# Patient Record
Sex: FEMALE | Race: WHITE | NOT HISPANIC OR LATINO | Employment: UNEMPLOYED | RURAL
[De-identification: names, ages, dates, MRNs, and addresses within clinical notes are randomized per-mention and may not be internally consistent; named-entity substitution may affect disease eponyms.]

---

## 2021-02-27 ENCOUNTER — HISTORICAL (OUTPATIENT)
Dept: ADMINISTRATIVE | Facility: HOSPITAL | Age: 46
End: 2021-02-27

## 2021-03-06 ENCOUNTER — HISTORICAL (OUTPATIENT)
Dept: ADMINISTRATIVE | Facility: HOSPITAL | Age: 46
End: 2021-03-06

## 2021-08-18 ENCOUNTER — HOSPITAL ENCOUNTER (EMERGENCY)
Facility: HOSPITAL | Age: 46
Discharge: HOME | End: 2021-08-18
Attending: EMERGENCY MEDICINE
Payer: MEDICAID

## 2021-08-18 VITALS
OXYGEN SATURATION: 99 % | WEIGHT: 209.63 LBS | HEIGHT: 68 IN | TEMPERATURE: 99 F | BODY MASS INDEX: 31.77 KG/M2 | HEART RATE: 97 BPM | DIASTOLIC BLOOD PRESSURE: 87 MMHG | RESPIRATION RATE: 18 BRPM | SYSTOLIC BLOOD PRESSURE: 147 MMHG

## 2021-08-18 DIAGNOSIS — J10.1 INFLUENZA A (H1N1): Primary | ICD-10-CM

## 2021-08-18 LAB
FLUAV AG UPPER RESP QL IA.RAPID: POSITIVE
FLUBV AG UPPER RESP QL IA.RAPID: NEGATIVE
SARS-COV+SARS-COV-2 AG RESP QL IA.RAPID: NEGATIVE

## 2021-08-18 PROCEDURE — 99283 EMERGENCY DEPT VISIT LOW MDM: CPT | Mod: ,,, | Performed by: EMERGENCY MEDICINE

## 2021-08-18 PROCEDURE — 99283 EMERGENCY DEPT VISIT LOW MDM: CPT

## 2021-08-18 PROCEDURE — 87428 SARSCOV & INF VIR A&B AG IA: CPT | Performed by: EMERGENCY MEDICINE

## 2021-08-18 PROCEDURE — 99283 PR EMERGENCY DEPT VISIT,LEVEL III: ICD-10-PCS | Mod: ,,, | Performed by: EMERGENCY MEDICINE

## 2021-08-18 RX ORDER — OSELTAMIVIR PHOSPHATE 75 MG/1
75 CAPSULE ORAL 2 TIMES DAILY
Qty: 10 CAPSULE | Refills: 0 | Status: SHIPPED | OUTPATIENT
Start: 2021-08-18 | End: 2021-08-23

## 2021-08-19 ENCOUNTER — TELEPHONE (OUTPATIENT)
Dept: EMERGENCY MEDICINE | Facility: HOSPITAL | Age: 46
End: 2021-08-19

## 2021-09-22 ENCOUNTER — HOSPITAL ENCOUNTER (OUTPATIENT)
Dept: RADIOLOGY | Facility: HOSPITAL | Age: 46
Discharge: HOME | End: 2021-09-22
Attending: FAMILY MEDICINE
Payer: MEDICAID

## 2021-09-22 DIAGNOSIS — R10.2 PELVIC PAIN: ICD-10-CM

## 2021-09-22 PROCEDURE — 73523 X-RAY EXAM HIPS BI 5/> VIEWS: CPT | Mod: TC

## 2021-11-06 ENCOUNTER — HOSPITAL ENCOUNTER (EMERGENCY)
Facility: HOSPITAL | Age: 46
Discharge: HOME | End: 2021-11-06
Attending: SPECIALIST
Payer: MEDICAID

## 2021-11-06 VITALS
HEART RATE: 90 BPM | HEIGHT: 68 IN | DIASTOLIC BLOOD PRESSURE: 81 MMHG | SYSTOLIC BLOOD PRESSURE: 126 MMHG | WEIGHT: 206 LBS | OXYGEN SATURATION: 95 % | TEMPERATURE: 99 F | BODY MASS INDEX: 31.22 KG/M2 | RESPIRATION RATE: 21 BRPM

## 2021-11-06 DIAGNOSIS — R07.1 CHEST PAIN ON BREATHING: ICD-10-CM

## 2021-11-06 DIAGNOSIS — K31.89 MASS OF STOMACH: ICD-10-CM

## 2021-11-06 DIAGNOSIS — I10 PRIMARY HYPERTENSION: Primary | ICD-10-CM

## 2021-11-06 DIAGNOSIS — F41.1 GENERALIZED ANXIETY DISORDER: ICD-10-CM

## 2021-11-06 DIAGNOSIS — N30.01 ACUTE CYSTITIS WITH HEMATURIA: ICD-10-CM

## 2021-11-06 DIAGNOSIS — R07.9 CHEST PAIN IN ADULT: ICD-10-CM

## 2021-11-06 DIAGNOSIS — R91.8 MASS OF LEFT LUNG: ICD-10-CM

## 2021-11-06 DIAGNOSIS — F15.20 AMPHETAMINE USE DISORDER, MODERATE: ICD-10-CM

## 2021-11-06 DIAGNOSIS — R07.9 CHEST PAIN: ICD-10-CM

## 2021-11-06 DIAGNOSIS — R00.0 SINUS TACHYCARDIA: ICD-10-CM

## 2021-11-06 PROBLEM — F43.0 PANIC ATTACK AS REACTION TO STRESS: Status: ACTIVE | Noted: 2021-11-06

## 2021-11-06 PROBLEM — F41.0 PANIC ATTACK AS REACTION TO STRESS: Status: ACTIVE | Noted: 2021-11-06

## 2021-11-06 LAB
ALBUMIN SERPL BCP-MCNC: 3.8 G/DL (ref 3.5–5)
ALBUMIN/GLOB SERPL: 0.9 {RATIO}
ALP SERPL-CCNC: 118 U/L (ref 39–100)
ALT SERPL W P-5'-P-CCNC: 26 U/L (ref 13–56)
AMPHET UR QL SCN: POSITIVE
ANION GAP SERPL CALCULATED.3IONS-SCNC: 12 MMOL/L (ref 7–16)
APTT PPP: 32.2 SECONDS (ref 25.2–37.3)
AST SERPL W P-5'-P-CCNC: 18 U/L (ref 15–37)
BACTERIA #/AREA URNS HPF: ABNORMAL /HPF
BARBITURATES UR QL SCN: NEGATIVE
BASOPHILS # BLD AUTO: 0.02 K/UL (ref 0–0.2)
BASOPHILS NFR BLD AUTO: 0.3 % (ref 0–1)
BENZODIAZ METAB UR QL SCN: NEGATIVE
BILIRUB SERPL-MCNC: 0.4 MG/DL (ref 0–1.2)
BILIRUB UR QL STRIP: NEGATIVE
BUN SERPL-MCNC: 20 MG/DL (ref 7–18)
BUN/CREAT SERPL: 19 (ref 6–20)
CALCIUM SERPL-MCNC: 8.6 MG/DL (ref 8.5–10.1)
CANNABINOIDS UR QL SCN: NEGATIVE
CHLORIDE SERPL-SCNC: 102 MMOL/L (ref 98–107)
CLARITY UR: ABNORMAL
CO2 SERPL-SCNC: 31 MMOL/L (ref 21–32)
COCAINE UR QL SCN: NEGATIVE
COLOR UR: YELLOW
CREAT SERPL-MCNC: 1.04 MG/DL (ref 0.55–1.02)
D DIMER PPP FEU-MCNC: 2.54 ΜG/ML (ref 0–0.47)
DIFFERENTIAL METHOD BLD: ABNORMAL
EOSINOPHIL # BLD AUTO: 0.04 K/UL (ref 0–0.5)
EOSINOPHIL NFR BLD AUTO: 0.5 % (ref 1–4)
ERYTHROCYTE [DISTWIDTH] IN BLOOD BY AUTOMATED COUNT: 14.3 % (ref 11.5–14.5)
GLOBULIN SER-MCNC: 4.4 G/DL (ref 2–4)
GLUCOSE SERPL-MCNC: 94 MG/DL (ref 74–106)
GLUCOSE UR STRIP-MCNC: NEGATIVE MG/DL
HCT VFR BLD AUTO: 43.3 % (ref 38–47)
HGB BLD-MCNC: 14 G/DL (ref 12–16)
IMM GRANULOCYTES # BLD AUTO: 0.01 K/UL (ref 0–0.04)
IMM GRANULOCYTES NFR BLD: 0.1 % (ref 0–0.4)
INR BLD: 0.94 (ref 0.9–1.1)
KETONES UR STRIP-SCNC: NEGATIVE MG/DL
LEUKOCYTE ESTERASE UR QL STRIP: ABNORMAL
LYMPHOCYTES # BLD AUTO: 0.82 K/UL (ref 1–4.8)
LYMPHOCYTES NFR BLD AUTO: 10.4 % (ref 27–41)
MAGNESIUM SERPL-MCNC: 2.1 MG/DL (ref 1.7–2.3)
MCH RBC QN AUTO: 28.8 PG (ref 27–31)
MCHC RBC AUTO-ENTMCNC: 32.3 G/DL (ref 32–36)
MCV RBC AUTO: 89.1 FL (ref 80–96)
MONOCYTES # BLD AUTO: 0.33 K/UL (ref 0–0.8)
MONOCYTES NFR BLD AUTO: 4.2 % (ref 2–6)
MPC BLD CALC-MCNC: 10 FL (ref 9.4–12.4)
MUCOUS THREADS #/AREA URNS HPF: ABNORMAL /HPF
NEUTROPHILS # BLD AUTO: 6.63 K/UL (ref 1.8–7.7)
NEUTROPHILS NFR BLD AUTO: 84.5 % (ref 53–65)
NITRITE UR QL STRIP: POSITIVE
NT-PROBNP SERPL-MCNC: 43 PG/ML (ref 1–125)
OPIATES UR QL SCN: NEGATIVE
PCP UR QL SCN: NEGATIVE
PH UR STRIP: 8.5 PH UNITS
PLATELET # BLD AUTO: 299 K/UL (ref 150–400)
POTASSIUM SERPL-SCNC: 3.7 MMOL/L (ref 3.5–5.1)
PROT SERPL-MCNC: 8.2 G/DL (ref 6.4–8.2)
PROT UR QL STRIP: 30
PROTHROMBIN TIME: 12.6 SECONDS (ref 11.7–14.7)
RBC # BLD AUTO: 4.86 M/UL (ref 4.2–5.4)
RBC # UR STRIP: NEGATIVE /UL
RBC #/AREA URNS HPF: ABNORMAL /HPF
SODIUM SERPL-SCNC: 141 MMOL/L (ref 136–145)
SP GR UR STRIP: 1.02
SQUAMOUS #/AREA URNS LPF: ABNORMAL /LPF
TRI-PHOS CRY #/AREA URNS LPF: ABNORMAL /LPF
TROPONIN I SERPL HS-MCNC: <4 PG/ML
UROBILINOGEN UR STRIP-ACNC: 1 MG/DL
WBC # BLD AUTO: 7.85 K/UL (ref 4.5–11)
WBC #/AREA URNS HPF: ABNORMAL /HPF
WBC CLUMPS, UA: ABNORMAL /HPF

## 2021-11-06 PROCEDURE — 93010 EKG 12-LEAD: ICD-10-PCS | Mod: ,,, | Performed by: INTERNAL MEDICINE

## 2021-11-06 PROCEDURE — 84075 ASSAY ALKALINE PHOSPHATASE: CPT | Performed by: SPECIALIST

## 2021-11-06 PROCEDURE — 81001 URINALYSIS AUTO W/SCOPE: CPT | Performed by: SPECIALIST

## 2021-11-06 PROCEDURE — 81003 URINALYSIS AUTO W/O SCOPE: CPT | Mod: 59 | Performed by: SPECIALIST

## 2021-11-06 PROCEDURE — 99285 EMERGENCY DEPT VISIT HI MDM: CPT | Mod: 25

## 2021-11-06 PROCEDURE — C9113 INJ PANTOPRAZOLE SODIUM, VIA: HCPCS | Performed by: SPECIALIST

## 2021-11-06 PROCEDURE — 87077 CULTURE AEROBIC IDENTIFY: CPT | Performed by: SPECIALIST

## 2021-11-06 PROCEDURE — 25000242 PHARM REV CODE 250 ALT 637 W/ HCPCS: Performed by: SPECIALIST

## 2021-11-06 PROCEDURE — 85730 THROMBOPLASTIN TIME PARTIAL: CPT | Performed by: SPECIALIST

## 2021-11-06 PROCEDURE — 93005 ELECTROCARDIOGRAM TRACING: CPT

## 2021-11-06 PROCEDURE — 25500020 PHARM REV CODE 255: Performed by: SPECIALIST

## 2021-11-06 PROCEDURE — 96375 TX/PRO/DX INJ NEW DRUG ADDON: CPT

## 2021-11-06 PROCEDURE — 25000003 PHARM REV CODE 250: Performed by: SPECIALIST

## 2021-11-06 PROCEDURE — 96372 THER/PROPH/DIAG INJ SC/IM: CPT | Mod: 59

## 2021-11-06 PROCEDURE — 80307 DRUG TEST PRSMV CHEM ANLYZR: CPT | Performed by: SPECIALIST

## 2021-11-06 PROCEDURE — 85610 PROTHROMBIN TIME: CPT | Performed by: SPECIALIST

## 2021-11-06 PROCEDURE — 93010 ELECTROCARDIOGRAM REPORT: CPT | Mod: ,,, | Performed by: INTERNAL MEDICINE

## 2021-11-06 PROCEDURE — 36415 COLL VENOUS BLD VENIPUNCTURE: CPT | Performed by: SPECIALIST

## 2021-11-06 PROCEDURE — 96365 THER/PROPH/DIAG IV INF INIT: CPT

## 2021-11-06 PROCEDURE — 80053 COMPREHEN METABOLIC PANEL: CPT | Performed by: SPECIALIST

## 2021-11-06 PROCEDURE — 85378 FIBRIN DEGRADE SEMIQUANT: CPT | Performed by: SPECIALIST

## 2021-11-06 PROCEDURE — 85025 COMPLETE CBC W/AUTO DIFF WBC: CPT | Performed by: SPECIALIST

## 2021-11-06 PROCEDURE — 99284 PR EMERGENCY DEPT VISIT,LEVEL IV: ICD-10-PCS | Mod: ,,, | Performed by: SPECIALIST

## 2021-11-06 PROCEDURE — 87086 URINE CULTURE/COLONY COUNT: CPT | Performed by: SPECIALIST

## 2021-11-06 PROCEDURE — 83735 ASSAY OF MAGNESIUM: CPT | Performed by: SPECIALIST

## 2021-11-06 PROCEDURE — 99284 EMERGENCY DEPT VISIT MOD MDM: CPT | Mod: ,,, | Performed by: SPECIALIST

## 2021-11-06 PROCEDURE — 84484 ASSAY OF TROPONIN QUANT: CPT | Performed by: SPECIALIST

## 2021-11-06 PROCEDURE — 83880 ASSAY OF NATRIURETIC PEPTIDE: CPT | Performed by: SPECIALIST

## 2021-11-06 PROCEDURE — 63600175 PHARM REV CODE 636 W HCPCS: Performed by: SPECIALIST

## 2021-11-06 RX ORDER — NITROGLYCERIN 0.4 MG/1
0.4 TABLET SUBLINGUAL
Status: COMPLETED | OUTPATIENT
Start: 2021-11-06 | End: 2021-11-06

## 2021-11-06 RX ORDER — AMLODIPINE BESYLATE 5 MG/1
5 TABLET ORAL DAILY
COMMUNITY
End: 2023-05-29 | Stop reason: CLARIF

## 2021-11-06 RX ORDER — PANTOPRAZOLE SODIUM 40 MG/10ML
40 INJECTION, POWDER, LYOPHILIZED, FOR SOLUTION INTRAVENOUS
Status: COMPLETED | OUTPATIENT
Start: 2021-11-06 | End: 2021-11-06

## 2021-11-06 RX ORDER — ENOXAPARIN SODIUM 100 MG/ML
1 INJECTION SUBCUTANEOUS
Status: COMPLETED | OUTPATIENT
Start: 2021-11-06 | End: 2021-11-06

## 2021-11-06 RX ORDER — ASPIRIN 325 MG
325 TABLET ORAL
Status: COMPLETED | OUTPATIENT
Start: 2021-11-06 | End: 2021-11-06

## 2021-11-06 RX ORDER — NITROFURANTOIN 25; 75 MG/1; MG/1
100 CAPSULE ORAL 2 TIMES DAILY
Qty: 14 CAPSULE | Refills: 0 | Status: SHIPPED | OUTPATIENT
Start: 2021-11-06 | End: 2021-11-09

## 2021-11-06 RX ADMIN — CEFTRIAXONE SODIUM 1 G: 1 INJECTION, POWDER, FOR SOLUTION INTRAMUSCULAR; INTRAVENOUS at 04:11

## 2021-11-06 RX ADMIN — IOPAMIDOL 80 ML: 755 INJECTION, SOLUTION INTRAVENOUS at 05:11

## 2021-11-06 RX ADMIN — ASPIRIN 325 MG ORAL TABLET 325 MG: 325 PILL ORAL at 04:11

## 2021-11-06 RX ADMIN — PANTOPRAZOLE SODIUM 40 MG: 40 INJECTION, POWDER, FOR SOLUTION INTRAVENOUS at 04:11

## 2021-11-06 RX ADMIN — ENOXAPARIN SODIUM 90 MG: 100 INJECTION SUBCUTANEOUS at 04:11

## 2021-11-06 RX ADMIN — NITROGLYCERIN 0.4 MG: 0.4 TABLET SUBLINGUAL at 04:11

## 2021-11-07 ENCOUNTER — TELEPHONE (OUTPATIENT)
Dept: EMERGENCY MEDICINE | Facility: HOSPITAL | Age: 46
End: 2021-11-07
Payer: MEDICAID

## 2021-11-09 RX ORDER — CEFDINIR 300 MG/1
300 CAPSULE ORAL 2 TIMES DAILY
Qty: 20 CAPSULE | Refills: 0 | Status: SHIPPED | OUTPATIENT
Start: 2021-11-09 | End: 2021-11-19

## 2021-11-10 ENCOUNTER — TELEPHONE (OUTPATIENT)
Dept: MEDSURG UNIT | Facility: HOSPITAL | Age: 46
End: 2021-11-10
Payer: MEDICAID

## 2021-11-10 LAB
UA COMPLETE W REFLEX CULTURE PNL UR: ABNORMAL
UA COMPLETE W REFLEX CULTURE PNL UR: ABNORMAL

## 2021-12-15 ENCOUNTER — HOSPITAL ENCOUNTER (EMERGENCY)
Facility: HOSPITAL | Age: 46
Discharge: HOME | End: 2021-12-15
Attending: EMERGENCY MEDICINE
Payer: MEDICAID

## 2021-12-15 VITALS
SYSTOLIC BLOOD PRESSURE: 137 MMHG | WEIGHT: 210 LBS | HEART RATE: 86 BPM | BODY MASS INDEX: 31.83 KG/M2 | TEMPERATURE: 98 F | DIASTOLIC BLOOD PRESSURE: 78 MMHG | OXYGEN SATURATION: 96 % | HEIGHT: 68 IN | RESPIRATION RATE: 18 BRPM

## 2021-12-15 DIAGNOSIS — S76.012A STRAIN OF LEFT HIP, INITIAL ENCOUNTER: Primary | ICD-10-CM

## 2021-12-15 DIAGNOSIS — S73.005A DISLOCATED HIP, LEFT, INITIAL ENCOUNTER: ICD-10-CM

## 2021-12-15 PROCEDURE — 99283 EMERGENCY DEPT VISIT LOW MDM: CPT | Mod: ,,, | Performed by: EMERGENCY MEDICINE

## 2021-12-15 PROCEDURE — 99283 PR EMERGENCY DEPT VISIT,LEVEL III: ICD-10-PCS | Mod: ,,, | Performed by: EMERGENCY MEDICINE

## 2021-12-15 PROCEDURE — 99284 EMERGENCY DEPT VISIT MOD MDM: CPT

## 2021-12-15 PROCEDURE — 96372 THER/PROPH/DIAG INJ SC/IM: CPT

## 2021-12-15 PROCEDURE — 63600175 PHARM REV CODE 636 W HCPCS: Performed by: EMERGENCY MEDICINE

## 2021-12-15 RX ORDER — ORPHENADRINE CITRATE 30 MG/ML
60 INJECTION INTRAMUSCULAR; INTRAVENOUS
Status: COMPLETED | OUTPATIENT
Start: 2021-12-15 | End: 2021-12-15

## 2021-12-15 RX ORDER — ASPIRIN 81 MG/1
81 TABLET ORAL DAILY
COMMUNITY
Start: 2021-12-04 | End: 2023-09-06 | Stop reason: CLARIF

## 2021-12-15 RX ORDER — OXYCODONE HYDROCHLORIDE 5 MG/1
5 TABLET ORAL EVERY 4 HOURS PRN
COMMUNITY
End: 2023-05-29 | Stop reason: CLARIF

## 2021-12-15 RX ORDER — TRAMADOL HYDROCHLORIDE 50 MG/1
50 TABLET ORAL EVERY 6 HOURS
COMMUNITY
End: 2023-05-29 | Stop reason: CLARIF

## 2021-12-15 RX ORDER — ACETAMINOPHEN 325 MG/1
2 CAPSULE, LIQUID FILLED ORAL 2 TIMES DAILY PRN
COMMUNITY
Start: 2021-12-04 | End: 2023-05-29 | Stop reason: CLARIF

## 2021-12-15 RX ORDER — MELOXICAM 15 MG/1
15 TABLET ORAL DAILY
COMMUNITY
Start: 2021-12-04 | End: 2023-05-29 | Stop reason: CLARIF

## 2021-12-15 RX ORDER — KETOROLAC TROMETHAMINE 30 MG/ML
30 INJECTION, SOLUTION INTRAMUSCULAR; INTRAVENOUS
Status: COMPLETED | OUTPATIENT
Start: 2021-12-15 | End: 2021-12-15

## 2021-12-15 RX ORDER — ONDANSETRON 4 MG/1
4 TABLET, FILM COATED ORAL 2 TIMES DAILY PRN
COMMUNITY
Start: 2021-12-04 | End: 2023-05-29 | Stop reason: CLARIF

## 2021-12-15 RX ADMIN — ORPHENADRINE CITRATE 60 MG: 30 INJECTION INTRAMUSCULAR; INTRAVENOUS at 08:12

## 2021-12-15 RX ADMIN — KETOROLAC TROMETHAMINE 30 MG: 30 INJECTION, SOLUTION INTRAMUSCULAR at 08:12

## 2021-12-16 ENCOUNTER — TELEPHONE (OUTPATIENT)
Dept: EMERGENCY MEDICINE | Facility: HOSPITAL | Age: 46
End: 2021-12-16
Payer: MEDICAID

## 2023-05-29 ENCOUNTER — HOSPITAL ENCOUNTER (INPATIENT)
Facility: HOSPITAL | Age: 48
LOS: 1 days | Discharge: LEFT BEFORE DISCHARGE | DRG: 194 | End: 2023-05-30
Attending: INTERNAL MEDICINE | Admitting: INTERNAL MEDICINE
Payer: MEDICAID

## 2023-05-29 DIAGNOSIS — E87.6 HYPOKALEMIA: ICD-10-CM

## 2023-05-29 DIAGNOSIS — N39.0 URINARY TRACT INFECTION WITH HEMATURIA, SITE UNSPECIFIED: ICD-10-CM

## 2023-05-29 DIAGNOSIS — J18.9 COMMUNITY ACQUIRED PNEUMONIA: Primary | ICD-10-CM

## 2023-05-29 DIAGNOSIS — E87.1 HYPONATREMIA: ICD-10-CM

## 2023-05-29 DIAGNOSIS — R31.9 URINARY TRACT INFECTION WITH HEMATURIA, SITE UNSPECIFIED: ICD-10-CM

## 2023-05-29 DIAGNOSIS — F15.10 AMPHETAMINE ABUSE: ICD-10-CM

## 2023-05-29 DIAGNOSIS — J18.9 COMMUNITY ACQUIRED PNEUMONIA OF LEFT LOWER LOBE OF LUNG: ICD-10-CM

## 2023-05-29 DIAGNOSIS — R06.02 SHORTNESS OF BREATH: ICD-10-CM

## 2023-05-29 DIAGNOSIS — N30.01 ACUTE CYSTITIS WITH HEMATURIA: ICD-10-CM

## 2023-05-29 LAB
ALBUMIN SERPL BCP-MCNC: 2.8 G/DL (ref 3.5–5)
ALBUMIN/GLOB SERPL: 0.6 {RATIO}
ALP SERPL-CCNC: 98 U/L (ref 41–108)
ALT SERPL W P-5'-P-CCNC: 31 U/L (ref 13–56)
AMPHET UR QL SCN: POSITIVE
ANION GAP SERPL CALCULATED.3IONS-SCNC: 13 MMOL/L (ref 7–16)
AST SERPL W P-5'-P-CCNC: 24 U/L (ref 15–37)
BACTERIA #/AREA URNS HPF: ABNORMAL /HPF
BARBITURATES UR QL SCN: NEGATIVE
BASOPHILS # BLD AUTO: 0.02 K/UL (ref 0–0.2)
BASOPHILS NFR BLD AUTO: 0.1 % (ref 0–1)
BENZODIAZ METAB UR QL SCN: NEGATIVE
BILIRUB SERPL-MCNC: 0.5 MG/DL (ref ?–1.2)
BILIRUB UR QL STRIP: ABNORMAL
BUN SERPL-MCNC: 16 MG/DL (ref 7–18)
BUN/CREAT SERPL: 18 (ref 6–20)
CALCIUM SERPL-MCNC: 8.1 MG/DL (ref 8.5–10.1)
CANNABINOIDS UR QL SCN: NEGATIVE
CHLORIDE SERPL-SCNC: 95 MMOL/L (ref 98–107)
CLARITY UR: ABNORMAL
CO2 SERPL-SCNC: 22 MMOL/L (ref 21–32)
COCAINE UR QL SCN: NEGATIVE
COLOR UR: ABNORMAL
CREAT SERPL-MCNC: 0.91 MG/DL (ref 0.55–1.02)
D DIMER PPP FEU-MCNC: 3.56 ΜG/ML (ref 0–0.47)
DIFFERENTIAL METHOD BLD: ABNORMAL
EGFR (NO RACE VARIABLE) (RUSH/TITUS): 77 ML/MIN/1.73M2
EOSINOPHIL # BLD AUTO: 0 K/UL (ref 0–0.5)
EOSINOPHIL NFR BLD AUTO: 0 % (ref 1–4)
ERYTHROCYTE [DISTWIDTH] IN BLOOD BY AUTOMATED COUNT: 14.6 % (ref 11.5–14.5)
FLUAV AG UPPER RESP QL IA.RAPID: NEGATIVE
FLUBV AG UPPER RESP QL IA.RAPID: NEGATIVE
GLOBULIN SER-MCNC: 4.6 G/DL (ref 2–4)
GLUCOSE SERPL-MCNC: 155 MG/DL (ref 74–106)
GLUCOSE UR STRIP-MCNC: NEGATIVE MG/DL
HCO3 UR-SCNC: 22.6 MMOL/L (ref 21–28)
HCT VFR BLD AUTO: 40.4 % (ref 38–47)
HGB BLD-MCNC: 13.3 G/DL (ref 12–16)
HYALINE CASTS #/AREA URNS LPF: ABNORMAL /LPF
IMM GRANULOCYTES # BLD AUTO: 0.09 K/UL (ref 0–0.04)
IMM GRANULOCYTES NFR BLD: 0.6 % (ref 0–0.4)
KETONES UR STRIP-SCNC: 15 MG/DL
LACTATE SERPL-SCNC: 1 MMOL/L (ref 0.4–2)
LEUKOCYTE ESTERASE UR QL STRIP: NEGATIVE
LYMPHOCYTES # BLD AUTO: 0.46 K/UL (ref 1–4.8)
LYMPHOCYTES NFR BLD AUTO: 3.1 % (ref 27–41)
MCH RBC QN AUTO: 28.2 PG (ref 27–31)
MCHC RBC AUTO-ENTMCNC: 32.9 G/DL (ref 32–36)
MCV RBC AUTO: 85.6 FL (ref 80–96)
MONOCYTES # BLD AUTO: 0.64 K/UL (ref 0–0.8)
MONOCYTES NFR BLD AUTO: 4.3 % (ref 2–6)
MPC BLD CALC-MCNC: 10.1 FL (ref 9.4–12.4)
MUCOUS THREADS #/AREA URNS HPF: ABNORMAL /HPF
NEUTROPHILS # BLD AUTO: 13.56 K/UL (ref 1.8–7.7)
NEUTROPHILS NFR BLD AUTO: 91.9 % (ref 53–65)
NITRITE UR QL STRIP: NEGATIVE
NT-PROBNP SERPL-MCNC: 580 PG/ML (ref 1–125)
OPIATES UR QL SCN: NEGATIVE
PCO2 BLDA: 31 MMHG (ref 35–48)
PCP UR QL SCN: NEGATIVE
PH SMN: 7.47 [PH] (ref 7.35–7.45)
PH UR STRIP: 6 PH UNITS
PLATELET # BLD AUTO: 248 K/UL (ref 150–400)
PO2 BLDA: 74 MMHG (ref 83–108)
POC BASE EXCESS: -0.3 MMOL/L (ref -2–3)
POC SATURATED O2: 96 %
POTASSIUM SERPL-SCNC: 3 MMOL/L (ref 3.5–5.1)
PROT SERPL-MCNC: 7.4 G/DL (ref 6.4–8.2)
PROT UR QL STRIP: >=300
RBC # BLD AUTO: 4.72 M/UL (ref 4.2–5.4)
RBC # UR STRIP: ABNORMAL /UL
RBC #/AREA URNS HPF: ABNORMAL /HPF
SARS-COV+SARS-COV-2 AG RESP QL IA.RAPID: NEGATIVE
SODIUM SERPL-SCNC: 127 MMOL/L (ref 136–145)
SP GR UR STRIP: >=1.03
SQUAMOUS #/AREA URNS LPF: ABNORMAL /LPF
TROPONIN I SERPL DL<=0.01 NG/ML-MCNC: 8.5 PG/ML
UROBILINOGEN UR STRIP-ACNC: 2 MG/DL
WBC # BLD AUTO: 14.77 K/UL (ref 4.5–11)
WBC #/AREA URNS HPF: ABNORMAL /HPF

## 2023-05-29 PROCEDURE — 94760 N-INVAS EAR/PLS OXIMETRY 1: CPT

## 2023-05-29 PROCEDURE — 81001 URINALYSIS AUTO W/SCOPE: CPT | Performed by: INTERNAL MEDICINE

## 2023-05-29 PROCEDURE — 99900035 HC TECH TIME PER 15 MIN (STAT)

## 2023-05-29 PROCEDURE — 85379 FIBRIN DEGRADATION QUANT: CPT | Performed by: INTERNAL MEDICINE

## 2023-05-29 PROCEDURE — 25000242 PHARM REV CODE 250 ALT 637 W/ HCPCS: Performed by: INTERNAL MEDICINE

## 2023-05-29 PROCEDURE — 84484 ASSAY OF TROPONIN QUANT: CPT | Performed by: INTERNAL MEDICINE

## 2023-05-29 PROCEDURE — 25000003 PHARM REV CODE 250: Performed by: INTERNAL MEDICINE

## 2023-05-29 PROCEDURE — 99222 PR INITIAL HOSPITAL CARE,LEVL II: ICD-10-PCS | Mod: ,,, | Performed by: INTERNAL MEDICINE

## 2023-05-29 PROCEDURE — 85025 COMPLETE CBC W/AUTO DIFF WBC: CPT | Performed by: INTERNAL MEDICINE

## 2023-05-29 PROCEDURE — 87186 SC STD MICRODIL/AGAR DIL: CPT | Performed by: INTERNAL MEDICINE

## 2023-05-29 PROCEDURE — 80053 COMPREHEN METABOLIC PANEL: CPT | Performed by: INTERNAL MEDICINE

## 2023-05-29 PROCEDURE — 82803 BLOOD GASES ANY COMBINATION: CPT

## 2023-05-29 PROCEDURE — 94640 AIRWAY INHALATION TREATMENT: CPT

## 2023-05-29 PROCEDURE — 96374 THER/PROPH/DIAG INJ IV PUSH: CPT

## 2023-05-29 PROCEDURE — 63600175 PHARM REV CODE 636 W HCPCS: Performed by: INTERNAL MEDICINE

## 2023-05-29 PROCEDURE — 93005 ELECTROCARDIOGRAM TRACING: CPT

## 2023-05-29 PROCEDURE — 87077 CULTURE AEROBIC IDENTIFY: CPT | Performed by: INTERNAL MEDICINE

## 2023-05-29 PROCEDURE — 80307 DRUG TEST PRSMV CHEM ANLYZR: CPT | Performed by: INTERNAL MEDICINE

## 2023-05-29 PROCEDURE — 83605 ASSAY OF LACTIC ACID: CPT | Performed by: INTERNAL MEDICINE

## 2023-05-29 PROCEDURE — 93010 EKG 12-LEAD: ICD-10-PCS | Mod: ,,, | Performed by: INTERNAL MEDICINE

## 2023-05-29 PROCEDURE — 99285 EMERGENCY DEPT VISIT HI MDM: CPT | Mod: 25

## 2023-05-29 PROCEDURE — 93010 ELECTROCARDIOGRAM REPORT: CPT | Mod: ,,, | Performed by: INTERNAL MEDICINE

## 2023-05-29 PROCEDURE — 36600 WITHDRAWAL OF ARTERIAL BLOOD: CPT

## 2023-05-29 PROCEDURE — 87040 BLOOD CULTURE FOR BACTERIA: CPT | Performed by: INTERNAL MEDICINE

## 2023-05-29 PROCEDURE — 96375 TX/PRO/DX INJ NEW DRUG ADDON: CPT

## 2023-05-29 PROCEDURE — 83880 ASSAY OF NATRIURETIC PEPTIDE: CPT | Performed by: INTERNAL MEDICINE

## 2023-05-29 PROCEDURE — 27000221 HC OXYGEN, UP TO 24 HOURS

## 2023-05-29 PROCEDURE — 87428 SARSCOV & INF VIR A&B AG IA: CPT | Performed by: INTERNAL MEDICINE

## 2023-05-29 PROCEDURE — 25500020 PHARM REV CODE 255: Performed by: INTERNAL MEDICINE

## 2023-05-29 PROCEDURE — 11000001 HC ACUTE MED/SURG PRIVATE ROOM

## 2023-05-29 PROCEDURE — 99222 1ST HOSP IP/OBS MODERATE 55: CPT | Mod: ,,, | Performed by: INTERNAL MEDICINE

## 2023-05-29 RX ORDER — ENOXAPARIN SODIUM 100 MG/ML
40 INJECTION SUBCUTANEOUS EVERY 24 HOURS
Status: DISCONTINUED | OUTPATIENT
Start: 2023-05-29 | End: 2023-05-30 | Stop reason: HOSPADM

## 2023-05-29 RX ORDER — ACETAMINOPHEN 325 MG/1
650 TABLET ORAL EVERY 4 HOURS PRN
Status: DISCONTINUED | OUTPATIENT
Start: 2023-05-29 | End: 2023-05-30 | Stop reason: HOSPADM

## 2023-05-29 RX ORDER — ONDANSETRON 2 MG/ML
4 INJECTION INTRAMUSCULAR; INTRAVENOUS EVERY 8 HOURS PRN
Status: DISCONTINUED | OUTPATIENT
Start: 2023-05-29 | End: 2023-05-30

## 2023-05-29 RX ORDER — SODIUM CHLORIDE 9 MG/ML
500 INJECTION, SOLUTION INTRAVENOUS
Status: COMPLETED | OUTPATIENT
Start: 2023-05-29 | End: 2023-05-29

## 2023-05-29 RX ORDER — POTASSIUM CHLORIDE 7.45 MG/ML
10 INJECTION INTRAVENOUS
Status: COMPLETED | OUTPATIENT
Start: 2023-05-29 | End: 2023-05-29

## 2023-05-29 RX ORDER — ACETAMINOPHEN 325 MG/1
650 TABLET ORAL EVERY 8 HOURS PRN
Status: DISCONTINUED | OUTPATIENT
Start: 2023-05-29 | End: 2023-05-30 | Stop reason: HOSPADM

## 2023-05-29 RX ORDER — SODIUM CHLORIDE 0.9 % (FLUSH) 0.9 %
10 SYRINGE (ML) INJECTION EVERY 12 HOURS PRN
Status: DISCONTINUED | OUTPATIENT
Start: 2023-05-29 | End: 2023-05-30 | Stop reason: HOSPADM

## 2023-05-29 RX ORDER — IPRATROPIUM BROMIDE AND ALBUTEROL SULFATE 2.5; .5 MG/3ML; MG/3ML
3 SOLUTION RESPIRATORY (INHALATION)
Status: DISCONTINUED | OUTPATIENT
Start: 2023-05-29 | End: 2023-05-30 | Stop reason: HOSPADM

## 2023-05-29 RX ADMIN — POTASSIUM CHLORIDE 10 MEQ: 7.46 INJECTION, SOLUTION INTRAVENOUS at 06:05

## 2023-05-29 RX ADMIN — IPRATROPIUM BROMIDE AND ALBUTEROL SULFATE 3 ML: .5; 3 SOLUTION RESPIRATORY (INHALATION) at 07:05

## 2023-05-29 RX ADMIN — AZITHROMYCIN MONOHYDRATE 500 MG: 500 INJECTION, POWDER, LYOPHILIZED, FOR SOLUTION INTRAVENOUS at 08:05

## 2023-05-29 RX ADMIN — IOPAMIDOL 100 ML: 755 INJECTION, SOLUTION INTRAVENOUS at 06:05

## 2023-05-29 RX ADMIN — SODIUM CHLORIDE 500 ML: 9 INJECTION, SOLUTION INTRAVENOUS at 06:05

## 2023-05-29 RX ADMIN — ENOXAPARIN SODIUM 40 MG: 40 INJECTION SUBCUTANEOUS at 08:05

## 2023-05-29 RX ADMIN — CEFTRIAXONE SODIUM 1 G: 1 INJECTION, POWDER, FOR SOLUTION INTRAMUSCULAR; INTRAVENOUS at 06:05

## 2023-05-29 NOTE — Clinical Note
Diagnosis: Community acquired pneumonia [403126]   Admitting Provider:: GUILLE RUDOLPH [[id number]]   Future Attending Provider: GUILLE RUDOLPH [[id number]]   Reason for IP Medical Treatment  (Clinical interventions that can only be accomplished in the IP setting? ) :: IV antibiotics, oxygen   I certify that Inpatient services for greater than or equal to 2 midnights are medically necessary:: Yes   Plans for Post-Acute care--if anticipated (pick the single best option):: A. No post acute care anticipated at this time

## 2023-05-29 NOTE — H&P
Problem 1.  Community-acquired left lower lobe pneumonia     Problem 2.  Hypokalemia     Problem 3.  Hyponatremia     Problem 4.  Amphetamine abuse      Problem 5.  Urinary tract infection    Patient is a 48-year-old white female that was brought in by ambulance with shortness breath for several days.  She states it was probably 3-4 days with nonproductive sputum.  She denies any chest pain, fever chills but she did have some nausea vomiting [facility] where they had to give her Zofran.  The patient was seen here in the past in 2021 with chest pain workup and shortness breath with a CT of the chest that showed possible left hilar mass but she admits to not being follow up.  She is not see a doctor, does not take any medications.  In the emergency room she was found to have left lower pneumonia, white count [id number], cardiac workup was negative.  She is been admitted now for IV antibiotics.      Past medical history:  No medical medications or history     Review of systems:  12 point review of systems noncontributory except for present illness     Physical exam:  Patient is alert aware no acute distress no focal deficits     HEENT:  PERRLA EOMI     Neck:  Supple no JVD bruits or masses     Lungs:  Rales rhonchi left base but no wheezes or bronchospasm     Heart:  Normal sinus no murmur gallop rub     Abdomen:  Soft bowel sounds normal nontender no guarding rebound     Extremities:  No peripheral edema cyanosis discoloration     Neurology:  No focal neurologic deficits     Chest x-ray:  Left lower lobe infiltrate consistent with pneumonia     EKG:  Normal sinus rhythm  without ischemic changes     Urinalysis:  Positive white blood cells, red blood cells, and urine drug screen positive for amphetamines     Plan 1.:  IV Rocephin and Zithromax, oxygen, potassium replacement, sodium replacement, urine culture,

## 2023-05-29 NOTE — ED PROVIDER NOTES
Encounter Date: 2023       History     Chief Complaint   Patient presents with    Shortness of Breath    Cough    Vomiting    Headache     Patient comes in complaining of shortness of breath nausea vomiting for last 2 days.  Patient states she does not take any medications in his not being followed by any primary care provider.  Patient denies any fever chills.      Old records in  shows patient was here for shortness breath and chest pain, workup revealed a left perihilar mass concerning for malignancy, patient has not had follow-up of that mass.  Patient also had used amphetamines at the time.      Review of patient's allergies indicates:  No Known Allergies  Past Medical History:   Diagnosis Date    Hypertension      Past Surgical History:   Procedure Laterality Date     SECTION      CHOLECYSTECTOMY      EYE SURGERY Left     HIP SURGERY Left     PLATE    JOINT REPLACEMENT Left     HIP    LEG SURGERY Left     TONSILLECTOMY       History reviewed. No pertinent family history.  Social History     Tobacco Use    Smoking status: Every Day     Packs/day: 0.50     Types: Cigarettes    Smokeless tobacco: Never   Substance Use Topics    Alcohol use: Never    Drug use: Never     Review of Systems   Constitutional:  Negative for fever.   HENT:  Negative for sore throat.    Respiratory:  Negative for shortness of breath.    Cardiovascular:  Negative for chest pain.   Gastrointestinal:  Negative for nausea.   Genitourinary:  Negative for dysuria.   Musculoskeletal:  Negative for back pain.   Skin:  Negative for rash.   Neurological:  Negative for weakness.   Hematological:  Does not bruise/bleed easily.     Physical Exam     Initial Vitals [23 1731]   BP Pulse Resp Temp SpO2   (!) 149/78 94 17 98.1 °F (36.7 °C) 95 %      MAP       --         Physical Exam    Vitals reviewed.  Constitutional: She appears well-developed. No distress.   Eyes: Pupils are equal, round, and reactive to light.   Neck: Trachea  normal. Neck supple.   Normal range of motion.   Full passive range of motion without pain.     Cardiovascular:  Normal rate, regular rhythm, normal heart sounds and normal pulses.           Pulmonary/Chest: No accessory muscle usage. No respiratory distress. She has decreased breath sounds. She has rhonchi.   Abdominal: Abdomen is soft and flat. Bowel sounds are normal.   Musculoskeletal:         General: Normal range of motion.      Cervical back: Full passive range of motion without pain, normal range of motion and neck supple.     Neurological: She is alert. She has normal strength and normal reflexes. No cranial nerve deficit. GCS eye subscore is 4. GCS verbal subscore is 5. GCS motor subscore is 6.   Skin: Skin is warm.   Psychiatric: She has a normal mood and affect.       Medical Screening Exam   See Full Note    ED Course   Procedures  Labs Reviewed   COMPREHENSIVE METABOLIC PANEL - Abnormal; Notable for the following components:       Result Value    Sodium 127 (*)     Potassium 3.0 (*)     Chloride 95 (*)     Glucose 155 (*)     Calcium 8.1 (*)     Albumin 2.8 (*)     Globulin 4.6 (*)     All other components within normal limits   NT-PRO NATRIURETIC PEPTIDE - Abnormal; Notable for the following components:    ProBNP 580 (*)     All other components within normal limits   D DIMER, QUANTITATIVE - Abnormal; Notable for the following components:    D-Dimer 3.56 (*)     All other components within normal limits   CBC WITH DIFFERENTIAL - Abnormal; Notable for the following components:    WBC 14.77 (*)     RDW 14.6 (*)     Neutrophils % 91.9 (*)     Lymphocytes % 3.1 (*)     Neutrophils, Abs 13.56 (*)     Lymphocytes, Absolute 0.46 (*)     Eosinophils % 0.0 (*)     Immature Granulocytes % 0.6 (*)     Immature Granulocytes, Absolute 0.09 (*)     All other components within normal limits   DRUG SCREEN, URINE (BEAKER) - Abnormal; Notable for the following components:    Amphetamine, Urine Positive (*)     All  other components within normal limits   URINALYSIS, REFLEX TO URINE CULTURE - Abnormal; Notable for the following components:    Protein, UA >=300 (*)     Ketones, UA 15 (*)     Urobilinogen, UA 2.0 (*)     Bilirubin, UA Moderate (*)     Blood, UA Large (*)     Specific Gravity, UA >=1.030 (*)     All other components within normal limits   URINALYSIS, MICROSCOPIC - Abnormal; Notable for the following components:    WBC, UA 20-50 (*)     RBC, UA 10-15 (*)     Bacteria, UA Moderate (*)     Squamous Epithelial Cells, UA Moderate (*)     Mucus, UA Moderate (*)     Hyaline Casts, UA 5-10 (*)     All other components within normal limits   SARS-COV2 (COVID) W/ FLU ANTIGEN - Normal    Narrative:     Negative SARS-CoV results should not be used as the sole basis for treatment or patient management decisions; negative results should be considered in the context of a patient's recent exposures, history and the presene of clinical signs and symptoms consistent with COVID-19.  Negative results should be treated as presumptive and confirmed by molecular assay, if necessary for patient management.   LACTIC ACID, PLASMA - Normal   TROPONIN I - Normal   CULTURE, BLOOD   CULTURE, BLOOD   CULTURE, URINE   CBC W/ AUTO DIFFERENTIAL    Narrative:     The following orders were created for panel order CBC Auto Differential.  Procedure                               Abnormality         Status                     ---------                               -----------         ------                     CBC with Differential[[id number]]        Abnormal            Final result                 Please view results for these tests on the individual orders.     EKG Readings: (Independently Interpreted)   Initial Reading: No STEMI. Previous EKG: Compared with most recent EKG Previous EKG Date: 11/6/2021. Rhythm: Normal Sinus Rhythm. Heart Rate: 83. Ectopy: No Ectopy. Conduction: Normal. ST Segments: Normal ST Segments. T Waves: Normal. Axis: Normal.  Clinical Impression: Normal Sinus Rhythm   Normal sinus rhythm rate of 83 and no acute ischemic changes   ECG Results              EKG 12-lead (In process)  Result time 05/29/23 18:15:39      In process by Interface, Lab In [facility] (05/29/23 18:15:39)                   Narrative:    Test Reason : R06.02,    Vent. Rate : 083 BPM     Atrial Rate : 083 BPM     P-R Int : 138 ms          QRS Dur : 082 ms      QT Int : 406 ms       P-R-T Axes : 035 054 026 degrees     QTc Int : 477 ms    Normal sinus rhythm  Cannot rule out Anterior infarct ,age undetermined  Abnormal ECG  When compared with ECG of 06-NOV-2021 15:51,  No significant change was found    Referred By: [email or web address]   SELF           Confirmed By:                                   Imaging Results              CTA Chest Non-Coronary (PE Studies) (In process)                      X-Ray Chest 1 View (Final result)  Result time 05/29/23 18:16:18      Final result by Sanya Nagy MD (05/29/23 18:16:18)                   Impression:      Left lower lobe pneumonia.    Place of service: [facility]      Electronically signed by: Sanya Nagy  Date:    05/29/2023  Time:    18:16               Narrative:    EXAMINATION:  XR CHEST 1 VIEW    CLINICAL HISTORY:  shortness of breath;    TECHNIQUE:  AP portable chest    COMPARISON:  11/06/2021    FINDINGS:  The cardiomediastinal silhouette is within normal limits.  Left basilar opacities are noted with blunting of the costophrenic angle.   the lungs are otherwise predominantly clear.  There is no pneumothorax or pleural effusion.    There is no acute osseous or soft tissue abnormality.                                       Medications   cefTRIAXone (ROCEPHIN) 1 g in dextrose 5 % in water (D5W) 5 % 50 mL IVPB (MB+) (has no administration in time range)   potassium chloride 10 mEq in 100 mL IVPB (has no administration in time range)   sodium chloride 0.9% flush 10 mL (has no administration in time range)    enoxaparin injection 40 mg (has no administration in time range)   cefTRIAXone (ROCEPHIN) 1 g in dextrose 5 % in water (D5W) 5 % 50 mL IVPB (MB+) (has no administration in time range)   azithromycin (ZITHROMAX) 500 mg in dextrose 5 % (D5W) 250 mL IVPB (Vial-Mate) (has no administration in time range)   acetaminophen tablet 650 mg (has no administration in time range)   ondansetron injection 4 mg (has no administration in time range)   albuterol-ipratropium 2.5 mg-0.5 mg/3 mL nebulizer solution 3 mL (has no administration in time range)   acetaminophen tablet 650 mg (has no administration in time range)     Medical Decision Making:   History:   Old Medical Records: I decided to obtain old medical records.  Old Records Summarized: records from clinic visits and records from previous admission(s).       <> Summary of Records: Patient workup in 2021 for chest pain shortness breath with a CTA of the chest showed left lung mass concerning for malignancy.  The patient has not had any follow up.  Initial Assessment:   Patient presents with shortness breath nausea vomiting for 2 days.  Differential Diagnosis:   Rule out pneumonia, pulmonary embolus, congestive heart failure, electrolyte imbalance,  Clinical Tests:   Lab Tests: Ordered and Reviewed  The following lab test(s) were unremarkable: CBC, CMP, D-Dimer, Troponin and BNP       <> Summary of Lab:   CBC is 214[address], D-dimer is elevated  Radiological Study: Ordered and Reviewed  Medical Tests: Ordered and Reviewed  ED Management:   Chest x-ray consistent with left lower lobe pneumonia, white count elevated 14,000, will start on antibiotics and admit.  ABG shows pH 7.47, pCO2 of 31, PO2 of 74 with sat 96%.  Will get CTA of the chest to rule out PE.           ED Course as of 05/29/23 1848   Mon May 29, 2023   1754 CBC Auto Differential(!) [PW]   1806 D-Dimer(!): 3.56 [PW]   1808 D-Dimer, Quantitative(!) [PW]   1809 Lactate, Joseph: 1.0 [PW]   1819 Influenza A: Negative [PW]    1819 Influenza B, Molecular: Negative [PW]   1819 COVID-19 Ag: Negative [PW]   1819 NT-proBNP(!): 580 [PW]   1819 Troponin I High Sensitivity: 8.5 [PW]   1820 Comprehensive Metabolic Panel(!) [PW]   1820 X-Ray Chest 1 View [PW]   1824 Urinalysis, Reflex to Urine Culture Urine, Clean Catch(!) [PW]   1825 POCT ARTERIAL BLOOD GAS(!) [PW]   1828 Amphetamine, Urine(!): Positive [PW]   1829 Urinalysis, Microscopic(!) [PW]   1829 WBC, UA(!): 20-50 [PW]      ED Course User Index  [PW] Nathan Titus MD                Clinical Impression:   Final diagnoses:  [R06.02] Shortness of breath  [J18.9] Community acquired pneumonia (Primary)  [E87.6] Hypokalemia  [E87.1] Hyponatremia  [F15.10] Amphetamine abuse  [N39.0, R31.9] Urinary tract infection with hematuria, site unspecified        ED Disposition Condition    Admit Stable                Nathan Titus MD  05/29/23 6830

## 2023-05-29 NOTE — ED TRIAGE NOTES
PATIENT PRESENTED TO ER VIA EMS WITH C/O SOB & HEADACHE THAT STARTED YESTERDAY & VOMITING THAT STARTED TODAY; DENIES ANY OUT OF THE WAY PO INTAKE; HAS TAKEN OTC MIGRAINE MEDICATIONS APPROXIMATELY 2 HOURS PRIOR TO COMING TO ER

## 2023-05-30 VITALS
BODY MASS INDEX: 32.4 KG/M2 | OXYGEN SATURATION: 98 % | HEART RATE: 82 BPM | DIASTOLIC BLOOD PRESSURE: 77 MMHG | SYSTOLIC BLOOD PRESSURE: 134 MMHG | HEIGHT: 68 IN | WEIGHT: 213.81 LBS | RESPIRATION RATE: 20 BRPM | TEMPERATURE: 100 F

## 2023-05-30 PROBLEM — M54.50 LOW BACK PAIN: Status: ACTIVE | Noted: 2023-05-30

## 2023-05-30 PROBLEM — G51.0 BELL'S PALSY: Status: ACTIVE | Noted: 2023-05-30

## 2023-05-30 PROBLEM — G89.4 CHRONIC PAIN SYNDROME: Status: ACTIVE | Noted: 2023-05-30

## 2023-05-30 PROBLEM — N94.9 FEMALE GENITAL SYMPTOMS: Status: ACTIVE | Noted: 2023-05-30

## 2023-05-30 PROBLEM — Z01.818 PRE-OP EXAM: Status: ACTIVE | Noted: 2023-05-30

## 2023-05-30 PROBLEM — G58.9 MONONEURITIS: Status: ACTIVE | Noted: 2023-05-30

## 2023-05-30 PROBLEM — Z87.828 HISTORY OF MOTOR VEHICLE ACCIDENT: Status: ACTIVE | Noted: 2023-05-30

## 2023-05-30 PROBLEM — R13.10 DYSPHAGIA: Status: ACTIVE | Noted: 2023-05-30

## 2023-05-30 PROBLEM — R03.0 ELEVATED BLOOD PRESSURE READING: Status: ACTIVE | Noted: 2023-05-30

## 2023-05-30 PROBLEM — F32.A DEPRESSION: Status: ACTIVE | Noted: 2023-05-30

## 2023-05-30 PROBLEM — R10.2 PELVIC PAIN IN FEMALE: Status: ACTIVE | Noted: 2023-05-30

## 2023-05-30 PROBLEM — M25.552 PAIN IN LEFT HIP: Status: ACTIVE | Noted: 2023-05-30

## 2023-05-30 PROBLEM — K21.00 GASTRO-ESOPHAGEAL REFLUX DISEASE WITH ESOPHAGITIS: Status: ACTIVE | Noted: 2023-05-30

## 2023-05-30 PROBLEM — Z01.810 ENCOUNTER FOR PREPROCEDURAL CARDIOVASCULAR EXAMINATION: Status: ACTIVE | Noted: 2023-05-30

## 2023-05-30 PROBLEM — J30.9 ALLERGIC RHINITIS: Status: ACTIVE | Noted: 2023-05-30

## 2023-05-30 PROBLEM — K02.9 DENTAL CARIES, UNSPECIFIED: Status: ACTIVE | Noted: 2023-05-30

## 2023-05-30 LAB
ANION GAP SERPL CALCULATED.3IONS-SCNC: 13 MMOL/L (ref 7–16)
BASOPHILS # BLD AUTO: 0.01 K/UL (ref 0–0.2)
BASOPHILS NFR BLD AUTO: 0.1 % (ref 0–1)
BUN SERPL-MCNC: 14 MG/DL (ref 7–18)
BUN/CREAT SERPL: 17 (ref 6–20)
CALCIUM SERPL-MCNC: 8.5 MG/DL (ref 8.5–10.1)
CHLORIDE SERPL-SCNC: 97 MMOL/L (ref 98–107)
CO2 SERPL-SCNC: 24 MMOL/L (ref 21–32)
CREAT SERPL-MCNC: 0.81 MG/DL (ref 0.55–1.02)
DIFFERENTIAL METHOD BLD: ABNORMAL
EGFR (NO RACE VARIABLE) (RUSH/TITUS): 89 ML/MIN/1.73M2
EOSINOPHIL # BLD AUTO: 0 K/UL (ref 0–0.5)
EOSINOPHIL NFR BLD AUTO: 0 % (ref 1–4)
ERYTHROCYTE [DISTWIDTH] IN BLOOD BY AUTOMATED COUNT: 14.9 % (ref 11.5–14.5)
GLUCOSE SERPL-MCNC: 134 MG/DL (ref 74–106)
HCT VFR BLD AUTO: 42.1 % (ref 38–47)
HGB BLD-MCNC: 13.8 G/DL (ref 12–16)
IMM GRANULOCYTES # BLD AUTO: 0.03 K/UL (ref 0–0.04)
IMM GRANULOCYTES NFR BLD: 0.3 % (ref 0–0.4)
LYMPHOCYTES # BLD AUTO: 0.41 K/UL (ref 1–4.8)
LYMPHOCYTES NFR BLD AUTO: 3.7 % (ref 27–41)
MAGNESIUM SERPL-MCNC: 1.8 MG/DL (ref 1.7–2.3)
MCH RBC QN AUTO: 28.2 PG (ref 27–31)
MCHC RBC AUTO-ENTMCNC: 32.8 G/DL (ref 32–36)
MCV RBC AUTO: 86.1 FL (ref 80–96)
MONOCYTES # BLD AUTO: 0.51 K/UL (ref 0–0.8)
MONOCYTES NFR BLD AUTO: 4.6 % (ref 2–6)
MPC BLD CALC-MCNC: 9.9 FL (ref 9.4–12.4)
NEUTROPHILS # BLD AUTO: 10.15 K/UL (ref 1.8–7.7)
NEUTROPHILS NFR BLD AUTO: 91.3 % (ref 53–65)
PLATELET # BLD AUTO: 250 K/UL (ref 150–400)
POTASSIUM SERPL-SCNC: 3.8 MMOL/L (ref 3.5–5.1)
RBC # BLD AUTO: 4.89 M/UL (ref 4.2–5.4)
SODIUM SERPL-SCNC: 130 MMOL/L (ref 136–145)
WBC # BLD AUTO: 11.11 K/UL (ref 4.5–11)

## 2023-05-30 PROCEDURE — 99499 UNLISTED E&M SERVICE: CPT | Mod: ,,, | Performed by: FAMILY MEDICINE

## 2023-05-30 PROCEDURE — 25000242 PHARM REV CODE 250 ALT 637 W/ HCPCS: Performed by: INTERNAL MEDICINE

## 2023-05-30 PROCEDURE — 25000003 PHARM REV CODE 250: Performed by: INTERNAL MEDICINE

## 2023-05-30 PROCEDURE — 25000003 PHARM REV CODE 250: Performed by: EMERGENCY MEDICINE

## 2023-05-30 PROCEDURE — 99900035 HC TECH TIME PER 15 MIN (STAT)

## 2023-05-30 PROCEDURE — 27000221 HC OXYGEN, UP TO 24 HOURS

## 2023-05-30 PROCEDURE — 99238 HOSP IP/OBS DSCHRG MGMT 30/<: CPT | Mod: ,,, | Performed by: INTERNAL MEDICINE

## 2023-05-30 PROCEDURE — 99238 PR HOSPITAL DISCHARGE DAY,<30 MIN: ICD-10-PCS | Mod: ,,, | Performed by: INTERNAL MEDICINE

## 2023-05-30 PROCEDURE — 99499 NO LOS: ICD-10-PCS | Mod: ,,, | Performed by: FAMILY MEDICINE

## 2023-05-30 PROCEDURE — 96372 THER/PROPH/DIAG INJ SC/IM: CPT

## 2023-05-30 PROCEDURE — 85025 COMPLETE CBC W/AUTO DIFF WBC: CPT | Performed by: INTERNAL MEDICINE

## 2023-05-30 PROCEDURE — 94761 N-INVAS EAR/PLS OXIMETRY MLT: CPT

## 2023-05-30 PROCEDURE — 83735 ASSAY OF MAGNESIUM: CPT | Performed by: INTERNAL MEDICINE

## 2023-05-30 PROCEDURE — 80048 BASIC METABOLIC PNL TOTAL CA: CPT | Performed by: INTERNAL MEDICINE

## 2023-05-30 PROCEDURE — 63600175 PHARM REV CODE 636 W HCPCS: Performed by: FAMILY MEDICINE

## 2023-05-30 PROCEDURE — 94640 AIRWAY INHALATION TREATMENT: CPT

## 2023-05-30 PROCEDURE — 63600175 PHARM REV CODE 636 W HCPCS: Performed by: INTERNAL MEDICINE

## 2023-05-30 RX ORDER — ONDANSETRON 2 MG/ML
4 INJECTION INTRAMUSCULAR; INTRAVENOUS EVERY 6 HOURS PRN
Status: DISCONTINUED | OUTPATIENT
Start: 2023-05-30 | End: 2023-05-30 | Stop reason: HOSPADM

## 2023-05-30 RX ORDER — MECLIZINE HCL 12.5 MG 12.5 MG/1
25 TABLET ORAL 3 TIMES DAILY PRN
Status: DISCONTINUED | OUTPATIENT
Start: 2023-05-30 | End: 2023-05-30 | Stop reason: HOSPADM

## 2023-05-30 RX ADMIN — CEFTRIAXONE SODIUM 1 G: 1 INJECTION, POWDER, FOR SOLUTION INTRAMUSCULAR; INTRAVENOUS at 05:05

## 2023-05-30 RX ADMIN — ONDANSETRON HYDROCHLORIDE 4 MG: 2 INJECTION, SOLUTION INTRAMUSCULAR; INTRAVENOUS at 03:05

## 2023-05-30 RX ADMIN — IPRATROPIUM BROMIDE AND ALBUTEROL SULFATE 3 ML: .5; 3 SOLUTION RESPIRATORY (INHALATION) at 07:05

## 2023-05-30 RX ADMIN — ENOXAPARIN SODIUM 40 MG: 40 INJECTION SUBCUTANEOUS at 05:05

## 2023-05-30 RX ADMIN — MECLIZINE HYDROCHLORIDE 25 MG: 12.5 TABLET ORAL at 05:05

## 2023-05-30 RX ADMIN — IPRATROPIUM BROMIDE AND ALBUTEROL SULFATE 3 ML: .5; 3 SOLUTION RESPIRATORY (INHALATION) at 01:05

## 2023-05-30 RX ADMIN — AZITHROMYCIN MONOHYDRATE 500 MG: 500 INJECTION, POWDER, LYOPHILIZED, FOR SOLUTION INTRAVENOUS at 07:05

## 2023-05-30 RX ADMIN — ONDANSETRON HYDROCHLORIDE 4 MG: 2 INJECTION, SOLUTION INTRAMUSCULAR; INTRAVENOUS at 08:05

## 2023-05-30 RX ADMIN — ACETAMINOPHEN 650 MG: 325 TABLET ORAL at 11:05

## 2023-05-30 NOTE — PLAN OF CARE
Problem: Adult Inpatient Plan of Care  Goal: Plan of Care Review  Outcome: Ongoing, Progressing  Goal: Patient-Specific Goal (Individualized)  Outcome: Ongoing, Progressing     Problem: Fall Injury Risk  Goal: Absence of Fall and Fall-Related Injury  Outcome: Ongoing, Progressing  Intervention: Identify and Manage Contributors  Flowsheets (Taken 5/30/2023 1723)  Self-Care Promotion:   independence encouraged   BADL personal objects within reach   BADL personal routines maintained  Medication Review/Management:   medications reviewed   high-risk medications identified  Intervention: Promote Injury-Free Environment  Flowsheets (Taken 5/30/2023 1723)  Safety Promotion/Fall Prevention:   assistive device/personal item within reach   diversional activities provided   high risk medications identified   medications reviewed   muscle strengthening facilitated   nonskid shoes/socks when out of bed   side rails raised x 3   instructed to call staff for mobility

## 2023-05-30 NOTE — NURSING
Notified ER Md due to patient c/o severe dizziness with nausea, bp 132/80, hr 106. Pt states the dizziness is laila. Zofran given for nausea. Waiting on reply.

## 2023-05-30 NOTE — HOSPITAL COURSE
5/30/23 - pt. Is doing well this AM. Still complaining of nausea. States that she has been taking street diet pills. Still with coarse bibasilar rales. Has a mass in her lung.

## 2023-05-30 NOTE — PROGRESS NOTES
[facility] Medicine  Progress Note    Patient Name: Viviane Seaman  MRN: [id number]  Patient Class: IP- Inpatient   Admission Date: 5/29/2023  Length of Stay: 1 days  Attending Physician: Nathan Titus MD  Primary Care Provider: Rocio Carroll MD        Subjective:     Principal Problem:Acute cystitis with hematuria        HPI:  No notes on file    Overview/Hospital Course:  5/30/23 - pt. Is doing well this AM. Still complaining of nausea. States that she has been taking street diet pills. Still with coarse bibasilar rales. Has a mass in her lung.      Interval History: pt. With lung mass. Still with bibasilar rales. Will continue present treatment.    Review of Systems  Objective:     Vital Signs (Most Recent):  Temp: 98.4 °F (36.9 °C) (05/30/23 0815)  Pulse: 101 (05/30/23 0815)  Resp: 18 (05/30/23 0815)  BP: 138/78 (05/30/23 0815)  SpO2: (!) 94 % (05/30/23 0815) Vital Signs (24h Range):  Temp:  [98.1 °F (36.7 °C)-98.7 °F (37.1 °C)] 98.4 °F (36.9 °C)  Pulse:  [] 101  Resp:  [17-20] 18  SpO2:  [94 %-99 %] 94 %  BP: (104-149)/(58-78) 138/78     Weight: 97 kg (213 lb 12.8 oz)  Body mass index is 32.51 kg/m².    Intake/Output Summary (Last 24 hours) at 5/30/2023 0819  Last data filed at 5/29/2023 2124  Gross per 24 hour   Intake 300 ml   Output --   Net 300 ml         Physical Exam        Significant Labs: All pertinent labs within the past 24 hours have been reviewed.    Significant Imaging: I have reviewed all pertinent imaging results/findings within the past 24 hours.      Assessment/Plan:      * Acute cystitis with hematuria          VTE Risk Mitigation (From admission, onward)         Ordered     enoxaparin injection 40 mg  Daily         05/29/23 1840     IP VTE HIGH RISK PATIENT  Once         05/29/23 1840     Place sequential compression device  Until discontinued         05/29/23 1840                Discharge Planning   ZURDO:      Code Status: Full Code    Is the patient medically ready for discharge?:     Reason for patient still in hospital (select all that apply): Patient trending condition                     Yudi Cannon MD  Department of Hospital Medicine   [facility]

## 2023-05-30 NOTE — PLAN OF CARE
Problem: Adult Inpatient Plan of Care  Goal: Plan of Care Review  Outcome: Ongoing, Progressing  Goal: Patient-Specific Goal (Individualized)  Outcome: Ongoing, Progressing  Goal: Absence of Hospital-Acquired Illness or Injury  Outcome: Ongoing, Progressing  Goal: Optimal Comfort and Wellbeing  Outcome: Ongoing, Progressing  Goal: Readiness for Transition of Care  Outcome: Ongoing, Progressing     Problem: Fall Injury Risk  Goal: Absence of Fall and Fall-Related Injury  Outcome: Ongoing, Progressing     Problem: Nausea and Vomiting  Goal: Fluid and Electrolyte Balance  Outcome: Ongoing, Progressing     Problem: Pain Acute  Goal: Acceptable Pain Control and Functional Ability  Outcome: Ongoing, Progressing     Problem: Infection  Goal: Absence of Infection Signs and Symptoms  Outcome: Ongoing, Progressing

## 2023-05-30 NOTE — SUBJECTIVE & OBJECTIVE
Interval History: pt. With lung mass. Still with bibasilar rales. Will continue present treatment.    Review of Systems  Objective:     Vital Signs (Most Recent):  Temp: 98.4 °F (36.9 °C) (05/30/23 0815)  Pulse: 101 (05/30/23 0815)  Resp: 18 (05/30/23 0815)  BP: 138/78 (05/30/23 0815)  SpO2: (!) 94 % (05/30/23 0815) Vital Signs (24h Range):  Temp:  [98.1 °F (36.7 °C)-98.7 °F (37.1 °C)] 98.4 °F (36.9 °C)  Pulse:  [] 101  Resp:  [17-20] 18  SpO2:  [94 %-99 %] 94 %  BP: (104-149)/(58-78) 138/78     Weight: 97 kg (213 lb 12.8 oz)  Body mass index is 32.51 kg/m².    Intake/Output Summary (Last 24 hours) at 5/30/2023 0819  Last data filed at 5/29/2023 2124  Gross per 24 hour   Intake 300 ml   Output --   Net 300 ml         Physical Exam        Significant Labs: All pertinent labs within the past 24 hours have been reviewed.    Significant Imaging: I have reviewed all pertinent imaging results/findings within the past 24 hours.

## 2023-05-30 NOTE — NURSING
Meclizine hydrochloride 25mg given as ordered for severe dizziness, will continue to monitor for effectiveness.

## 2023-05-31 NOTE — NURSING
Notified ER physician due to patient requesting to leave now. States she has issues at home that she has to take care. And pts daughter is present to transport home.

## 2023-05-31 NOTE — NURSING
Pt  present to transport patient home, via personal car. Pt denies n/v and dizziness, pain. No acute distress noted. All personal belonging given. No change in status since initial assessment.

## 2023-05-31 NOTE — DISCHARGE SUMMARY
PROBLEM 1.  COMMUNITY-ACQUIRED LEFT LOWER LOBE PNEUMONIA     PROBLEM 2.  AMPHETAMINE ABUSE     PROBLEM 3. UTI     PROBLEM 4. HYPONATREMIA     PROBLEM 5. HYPOKALEMIA     Hospital course:    THE PATIENT IS A 48-YEAR-OLD WHITE FEMALE THAT WAS ADMITTED FOR IV ANTIBIOTICS AND POTASSIUM SODIUM REPLACEMENT.  Patient was seen on morning rounds doing better.  She was continued a IV antibiotics sodium replacement potassium replacement.    This evening the patient became very irate, demanded to be sign out against medical advice.  She told the nurses she was leaving immediately, I was not given opportunity talked to the patient, and she left the facility.    Discharge condition:  The same     Follow-up:  Patient signed out AMA     Diet:  As tolerated per patient

## 2023-05-31 NOTE — PLAN OF CARE
Problem: Adult Inpatient Plan of Care  Goal: Plan of Care Review  Outcome: Ongoing, Progressing  Goal: Patient-Specific Goal (Individualized)  Outcome: Ongoing, Progressing  Goal: Absence of Hospital-Acquired Illness or Injury  Outcome: Ongoing, Progressing  Goal: Optimal Comfort and Wellbeing  Outcome: Ongoing, Progressing  Goal: Readiness for Transition of Care  Outcome: Ongoing, Progressing     Problem: Fall Injury Risk  Goal: Absence of Fall and Fall-Related Injury  Outcome: Ongoing, Progressing     Problem: Nausea and Vomiting  Goal: Fluid and Electrolyte Balance  Outcome: Ongoing, Progressing     Problem: Nausea and Vomiting  Goal: Fluid and Electrolyte Balance  Outcome: Ongoing, Progressing     Problem: Pain Acute  Goal: Acceptable Pain Control and Functional Ability  Outcome: Ongoing, Progressing     Problem: Infection  Goal: Absence of Infection Signs and Symptoms  Outcome: Ongoing, Progressing

## 2023-06-01 LAB — UA COMPLETE W REFLEX CULTURE PNL UR: ABNORMAL

## 2023-06-05 LAB
BACTERIA BLD CULT: NORMAL
BACTERIA BLD CULT: NORMAL

## 2023-06-06 ENCOUNTER — HOSPITAL ENCOUNTER (EMERGENCY)
Facility: HOSPITAL | Age: 48
Discharge: ANOTHER HOSPITAL | End: 2023-06-06
Attending: INTERNAL MEDICINE
Payer: MEDICAID

## 2023-06-06 VITALS
RESPIRATION RATE: 20 BRPM | HEART RATE: 90 BPM | WEIGHT: 218.63 LBS | BODY MASS INDEX: 33.14 KG/M2 | OXYGEN SATURATION: 100 % | HEIGHT: 68 IN | TEMPERATURE: 98 F | DIASTOLIC BLOOD PRESSURE: 90 MMHG | SYSTOLIC BLOOD PRESSURE: 149 MMHG

## 2023-06-06 DIAGNOSIS — L03.116 LEFT LEG CELLULITIS: Primary | ICD-10-CM

## 2023-06-06 DIAGNOSIS — M79.89 LEG SWELLING: ICD-10-CM

## 2023-06-06 LAB
BASOPHILS # BLD AUTO: 0.04 K/UL (ref 0–0.2)
BASOPHILS NFR BLD AUTO: 0.4 % (ref 0–1)
D DIMER PPP FEU-MCNC: 3.13 ΜG/ML (ref 0–0.47)
DIFFERENTIAL METHOD BLD: ABNORMAL
EOSINOPHIL # BLD AUTO: 0.13 K/UL (ref 0–0.5)
EOSINOPHIL NFR BLD AUTO: 1.4 % (ref 1–4)
ERYTHROCYTE [DISTWIDTH] IN BLOOD BY AUTOMATED COUNT: 14.9 % (ref 11.5–14.5)
HCT VFR BLD AUTO: 36.4 % (ref 38–47)
HGB BLD-MCNC: 11.5 G/DL (ref 12–16)
IMM GRANULOCYTES # BLD AUTO: 0.04 K/UL (ref 0–0.04)
IMM GRANULOCYTES NFR BLD: 0.4 % (ref 0–0.4)
LYMPHOCYTES # BLD AUTO: 1.35 K/UL (ref 1–4.8)
LYMPHOCYTES NFR BLD AUTO: 15 % (ref 27–41)
MCH RBC QN AUTO: 27.8 PG (ref 27–31)
MCHC RBC AUTO-ENTMCNC: 31.6 G/DL (ref 32–36)
MCV RBC AUTO: 87.9 FL (ref 80–96)
MONOCYTES # BLD AUTO: 0.65 K/UL (ref 0–0.8)
MONOCYTES NFR BLD AUTO: 7.2 % (ref 2–6)
MPC BLD CALC-MCNC: 9.1 FL (ref 9.4–12.4)
NEUTROPHILS # BLD AUTO: 6.78 K/UL (ref 1.8–7.7)
NEUTROPHILS NFR BLD AUTO: 75.6 % (ref 53–65)
PLATELET # BLD AUTO: 562 K/UL (ref 150–400)
RBC # BLD AUTO: 4.14 M/UL (ref 4.2–5.4)
WBC # BLD AUTO: 8.99 K/UL (ref 4.5–11)

## 2023-06-06 PROCEDURE — 85379 FIBRIN DEGRADATION QUANT: CPT | Performed by: INTERNAL MEDICINE

## 2023-06-06 PROCEDURE — 99284 PR EMERGENCY DEPT VISIT,LEVEL IV: ICD-10-PCS | Mod: ,,, | Performed by: INTERNAL MEDICINE

## 2023-06-06 PROCEDURE — 85025 COMPLETE CBC W/AUTO DIFF WBC: CPT | Performed by: INTERNAL MEDICINE

## 2023-06-06 PROCEDURE — 99285 EMERGENCY DEPT VISIT HI MDM: CPT

## 2023-06-06 PROCEDURE — 99284 EMERGENCY DEPT VISIT MOD MDM: CPT | Mod: ,,, | Performed by: INTERNAL MEDICINE

## 2023-06-07 ENCOUNTER — HOSPITAL ENCOUNTER (EMERGENCY)
Facility: HOSPITAL | Age: 48
Discharge: HOME | End: 2023-06-07
Attending: FAMILY MEDICINE
Payer: MEDICAID

## 2023-06-07 VITALS
WEIGHT: 218 LBS | TEMPERATURE: 99 F | OXYGEN SATURATION: 97 % | RESPIRATION RATE: 20 BRPM | HEART RATE: 92 BPM | HEIGHT: 68 IN | BODY MASS INDEX: 33.04 KG/M2 | DIASTOLIC BLOOD PRESSURE: 96 MMHG | SYSTOLIC BLOOD PRESSURE: 169 MMHG

## 2023-06-07 DIAGNOSIS — L03.90 CELLULITIS, UNSPECIFIED CELLULITIS SITE: Primary | ICD-10-CM

## 2023-06-07 DIAGNOSIS — I82.409 DVT (DEEP VENOUS THROMBOSIS): ICD-10-CM

## 2023-06-07 PROCEDURE — 25000003 PHARM REV CODE 250: Performed by: FAMILY MEDICINE

## 2023-06-07 PROCEDURE — 63600175 PHARM REV CODE 636 W HCPCS: Performed by: FAMILY MEDICINE

## 2023-06-07 PROCEDURE — 99285 EMERGENCY DEPT VISIT HI MDM: CPT | Mod: 25

## 2023-06-07 PROCEDURE — 99284 PR EMERGENCY DEPT VISIT,LEVEL IV: ICD-10-PCS | Mod: ,,, | Performed by: FAMILY MEDICINE

## 2023-06-07 PROCEDURE — 99284 EMERGENCY DEPT VISIT MOD MDM: CPT | Mod: ,,, | Performed by: FAMILY MEDICINE

## 2023-06-07 PROCEDURE — 96372 THER/PROPH/DIAG INJ SC/IM: CPT | Performed by: FAMILY MEDICINE

## 2023-06-07 RX ORDER — KETOROLAC TROMETHAMINE 10 MG/1
10 TABLET, FILM COATED ORAL EVERY 6 HOURS
Qty: 10 TABLET | Refills: 0 | Status: SHIPPED | OUTPATIENT
Start: 2023-06-07 | End: 2023-06-10

## 2023-06-07 RX ORDER — LEVOFLOXACIN 750 MG/1
750 TABLET ORAL DAILY
Status: COMPLETED | OUTPATIENT
Start: 2023-06-07 | End: 2023-06-07

## 2023-06-07 RX ORDER — LEVOFLOXACIN 500 MG/1
750 TABLET, FILM COATED ORAL DAILY
Qty: 10 TABLET | Refills: 0 | Status: SHIPPED | OUTPATIENT
Start: 2023-06-07 | End: 2023-06-17

## 2023-06-07 RX ORDER — LEVOFLOXACIN 750 MG/1
750 TABLET ORAL DAILY
Status: DISCONTINUED | OUTPATIENT
Start: 2023-06-07 | End: 2023-06-07

## 2023-06-07 RX ORDER — HYDROCODONE BITARTRATE AND ACETAMINOPHEN 7.5; 325 MG/1; MG/1
1 TABLET ORAL EVERY 6 HOURS PRN
Qty: 12 TABLET | Refills: 0 | Status: SHIPPED | OUTPATIENT
Start: 2023-06-07 | End: 2023-09-06 | Stop reason: CLARIF

## 2023-06-07 RX ORDER — ONDANSETRON 4 MG/1
4 TABLET, FILM COATED ORAL EVERY 6 HOURS
Qty: 12 TABLET | Refills: 0 | Status: SHIPPED | OUTPATIENT
Start: 2023-06-07 | End: 2023-09-06 | Stop reason: CLARIF

## 2023-06-07 RX ORDER — PROMETHAZINE HYDROCHLORIDE 25 MG/ML
25 INJECTION, SOLUTION INTRAMUSCULAR; INTRAVENOUS
Status: COMPLETED | OUTPATIENT
Start: 2023-06-07 | End: 2023-06-07

## 2023-06-07 RX ORDER — HYDROMORPHONE HYDROCHLORIDE 2 MG/ML
2 INJECTION, SOLUTION INTRAMUSCULAR; INTRAVENOUS; SUBCUTANEOUS
Status: COMPLETED | OUTPATIENT
Start: 2023-06-07 | End: 2023-06-07

## 2023-06-07 RX ADMIN — PROMETHAZINE HYDROCHLORIDE 25 MG: 25 INJECTION INTRAMUSCULAR; INTRAVENOUS at 01:06

## 2023-06-07 RX ADMIN — HYDROMORPHONE HYDROCHLORIDE 2 MG: 2 INJECTION, SOLUTION INTRAMUSCULAR; INTRAVENOUS; SUBCUTANEOUS at 01:06

## 2023-06-07 RX ADMIN — LEVOFLOXACIN 750 MG: 750 TABLET, FILM COATED ORAL at 01:06

## 2023-06-07 NOTE — ED PROVIDER NOTES
Encounter Date: 2023       History     Chief Complaint   Patient presents with    Leg Pain     Sent from [place] for US     Patient with left leg swelling for 4 days.      Review of patient's allergies indicates:   Allergen Reactions    Cephalexin      Past Medical History:   Diagnosis Date    Hypertension      Past Surgical History:   Procedure Laterality Date     SECTION      CHOLECYSTECTOMY      EYE SURGERY Left     HIP SURGERY Left     PLATE    JOINT REPLACEMENT Left     HIP    LEG SURGERY Left     TONSILLECTOMY       History reviewed. No pertinent family history.  Social History     Tobacco Use    Smoking status: Every Day     Packs/day: 0.50     Types: Cigarettes    Smokeless tobacco: Never   Substance Use Topics    Alcohol use: Never    Drug use: Never     Review of Systems   Constitutional: Negative.  Negative for fever.   HENT: Negative.  Negative for sore throat.    Eyes: Negative.    Respiratory: Negative.  Negative for shortness of breath.    Cardiovascular: Negative.  Negative for chest pain.   Gastrointestinal: Negative.  Negative for nausea.   Endocrine: Negative.    Genitourinary: Negative.  Negative for dysuria.   Musculoskeletal: Negative.  Negative for back pain.        Left leg swelling for 4 days.  In for ultrasound   Skin: Negative.  Negative for rash.   Allergic/Immunologic: Negative.    Neurological: Negative.  Negative for weakness.   Hematological: Negative.  Does not bruise/bleed easily.   Psychiatric/Behavioral: Negative.     All other systems reviewed and are negative.    Physical Exam     Initial Vitals [23 0027]   BP Pulse Resp Temp SpO2   (!) 155/93 90 18 98.3 °F (36.8 °C) 99 %      MAP       --         Physical Exam    Constitutional: She appears well-developed and well-nourished.   HENT:   Head: Normocephalic and atraumatic.   Right Ear: External ear normal.   Left Ear: External ear normal.   Nose: Nose normal.   Mouth/Throat: Oropharynx is clear and moist.   Eyes:  Conjunctivae and EOM are normal. Pupils are equal, round, and reactive to light.   Neck: Neck supple.   Normal range of motion.  Cardiovascular:  Normal rate, regular rhythm, normal heart sounds and intact distal pulses.           Pulmonary/Chest: Breath sounds normal.   Abdominal: Abdomen is soft. Bowel sounds are normal.   Genitourinary:    Vagina and uterus normal.     Musculoskeletal:         General: Normal range of motion.      Cervical back: Normal range of motion and neck supple.      Comments: Left leg swelling and edema     Neurological: She is alert and oriented to person, place, and time. She has normal strength and normal reflexes.   Skin: Skin is warm. Capillary refill takes less than 2 seconds.   Psychiatric: She has a normal mood and affect. Her behavior is normal. Judgment and thought content normal.       Medical Screening Exam   See Full Note    ED Course   Procedures  Labs Reviewed - No data to display       Imaging Results              US Lower Extremity Veins Left (Final result)  Result time 06/07/23 07:36:32      Final result by Ricardo Robins II, MD (06/07/23 07:36:32)                   Impression:      No evidence of deep venous thrombosis.    Ultrasound images stored and captured.      Electronically signed by: Ricardo Robins  Date:    06/07/2023  Time:    07:36               Narrative:    EXAMINATION:  US LOWER EXTREMITY VEINS LEFT    CLINICAL HISTORY:  Acute embolism and thrombosis of unspecified deep veins of unspecified lower extremity    TECHNIQUE:  Duplex and color flow Doppler and dynamic compression was performed of the veins was performed.    COMPARISON:  None.    FINDINGS:  No evidence of echogenic, non-compressible thrombus seen in the visualized veins of the extremities.  Color Doppler venous waveform pattern is within normal limits.  Prominent lymph nodes left inguinal region.                                       Medications   levoFLOXacin tablet 750 mg (750 mg Oral Given  6/7/23 0133)   HYDROmorphone (PF) injection 2 mg (2 mg Intramuscular Given 6/7/23 0133)   promethazine injection 25 mg (25 mg Intramuscular Given 6/7/23 0133)     Medical Decision Making:   Initial Assessment:   Patient with left leg swelling and edema  Differential Diagnosis:   Dependent edema of lower extremity  ED Management:  Follow-up primary care provider                       Clinical Impression:   Final diagnoses:  [I82.409] DVT (deep venous thrombosis)  [L03.90] Cellulitis, unspecified cellulitis site (Primary)        ED Disposition Condition    Discharge Stable          ED Prescriptions       Medication Sig Dispense Start Date End Date Auth. Provider    levoFLOXacin (LEVAQUIN) 500 MG tablet Take 1.5 tablets (750 mg total) by mouth once daily. for 10 days 10 tablet 6/7/2023 6/17/2023 Kin Reyes, DO    HYDROcodone-acetaminophen (NORCO) 7.5-325 mg per tablet Take 1 tablet by mouth every 6 (six) hours as needed for Pain. 12 tablet 6/7/2023 -- Kin Reyes DO    ondansetron (ZOFRAN) 4 MG tablet Take 1 tablet (4 mg total) by mouth every 6 (six) hours. 12 tablet 6/7/2023 -- Kin Reyes DO    ketorolac (TORADOL) 10 mg tablet Take 1 tablet (10 mg total) by mouth every 6 (six) hours. for 10 doses 10 tablet 6/7/2023 6/10/2023 Kin Reyes DO          Follow-up Information    None          Kin Reyes DO  06/08/23 5719

## 2023-06-07 NOTE — ED PROVIDER NOTES
Encounter Date: 2023       History     Chief Complaint   Patient presents with    Cellulitis     Patient complained of left leg swelling for 4 days.  Started turning red non painful.  No chest pain shortness breath PND and orthopnea.  Patient was hospitalized here last week with pneumonia but signed out AMA.  Has not follow with a primary care provider.  Denies any trauma.  Patient is status post left hip replacement in the past.      Review of patient's allergies indicates:   Allergen Reactions    Cephalexin      Past Medical History:   Diagnosis Date    Hypertension      Past Surgical History:   Procedure Laterality Date     SECTION      CHOLECYSTECTOMY      EYE SURGERY Left     HIP SURGERY Left     PLATE    JOINT REPLACEMENT Left     HIP    LEG SURGERY Left     TONSILLECTOMY       History reviewed. No pertinent family history.  Social History     Tobacco Use    Smoking status: Every Day     Packs/day: 0.50     Types: Cigarettes    Smokeless tobacco: Never   Substance Use Topics    Alcohol use: Never    Drug use: Never     Review of Systems   Constitutional:  Negative for fever.   HENT:  Negative for sore throat.    Respiratory:  Negative for shortness of breath.    Cardiovascular:  Negative for chest pain.   Gastrointestinal:  Negative for nausea.   Genitourinary:  Negative for dysuria.   Musculoskeletal:  Negative for back pain.   Skin:  Negative for rash.   Neurological:  Negative for weakness.   Hematological:  Does not bruise/bleed easily.     Physical Exam     Initial Vitals   BP Pulse Resp Temp SpO2   23 2207 23 2207 23 2207 23 2207 23 2211   (!) 173/94 102 20 98 °F (36.7 °C) 100 %      MAP       --                Physical Exam    Vitals reviewed.  Constitutional: She appears well-developed.   Eyes: Pupils are equal, round, and reactive to light.   Neck:   Normal range of motion.  Cardiovascular:  Normal rate.           Pulmonary/Chest: Breath sounds normal.    Abdominal: Abdomen is soft.   Musculoskeletal:         General: Normal range of motion.      Cervical back: Normal range of motion.      Left lower leg: Swelling and tenderness present. Edema present.        Legs:       Comments: Left lower leg swollen erythematous tender but distal neurovascular motor function normal tender in the calf.     Neurological: She is alert.   Skin: Skin is warm.   Psychiatric: She has a normal mood and affect.       Medical Screening Exam   See Full Note    ED Course   Procedures  Labs Reviewed   D DIMER, QUANTITATIVE - Abnormal; Notable for the following components:       Result Value    D-Dimer 3.13 (*)     All other components within normal limits   CBC WITH DIFFERENTIAL - Abnormal; Notable for the following components:    RBC 4.14 (*)     Hemoglobin 11.5 (*)     Hematocrit 36.4 (*)     MCHC 31.6 (*)     RDW 14.9 (*)     Platelet Count 562 (*)     MPV 9.1 (*)     Neutrophils % 75.6 (*)     Lymphocytes % 15.0 (*)     Monocytes % 7.2 (*)     All other components within normal limits   CBC W/ AUTO DIFFERENTIAL    Narrative:     The following orders were created for panel order CBC auto differential.  Procedure                               Abnormality         Status                     ---------                               -----------         ------                     CBC with Differential[[id number]]        Abnormal            Final result                 Please view results for these tests on the individual orders.          Imaging Results    None          Medications - No data to display  Medical Decision Making:   History:   Old Medical Records: I decided to obtain old medical records.  Old Records Summarized: records from previous admission(s) and records from clinic visits.       <> Summary of Records: Patient treated with pneumonia here last week with CTA showing nausea use possible malignancy but patient signed AMA.  Initial Assessment:   Left leg swelling for 4 days worse  with redness and tenderness  Differential Diagnosis:   Rule out DVT, phlebitis, thrombophlebitis  Clinical Tests:   Lab Tests: Ordered and Reviewed  The following lab test(s) were unremarkable: CBC and D-Dimer       <> Summary of Lab: ELEVATED D-DIMER  ED Management:  Patient with elevated D-dimer and left lower leg swelling consistent with possible DVT or thrombophlebitis.  Will transfer to [facility] for ultrasound, Dr. Reyes has accepted the patient to the ER           ED Course as of 06/06/23 2258 Tue Jun 06, 2023 2241 D dimer, quantitative(!) [PW]   2242 CBC auto differential(!) [PW]      ED Course User Index  [PW] Nathan Titus MD                Clinical Impression:   Final diagnoses:  [L03.116] Left leg cellulitis (Primary)  [M79.89] Leg swelling        ED Disposition Condition    ED to ED Transfer Stable                Nathan Titus MD  06/06/23 5707

## 2023-06-07 NOTE — ED NOTES
Rec'd call from [facility]. Pt accepted by Dr. Reyes, pt going to [facility] ED.

## 2023-08-10 DIAGNOSIS — R91.8 LUNG MASS: Primary | ICD-10-CM

## 2023-08-23 ENCOUNTER — OFFICE VISIT (OUTPATIENT)
Dept: PULMONOLOGY | Facility: CLINIC | Age: 48
End: 2023-08-23
Payer: MEDICAID

## 2023-08-23 VITALS
OXYGEN SATURATION: 99 % | DIASTOLIC BLOOD PRESSURE: 110 MMHG | WEIGHT: 218 LBS | HEART RATE: 89 BPM | SYSTOLIC BLOOD PRESSURE: 174 MMHG | BODY MASS INDEX: 33.04 KG/M2 | HEIGHT: 68 IN | RESPIRATION RATE: 16 BRPM

## 2023-08-23 DIAGNOSIS — R91.8 MASS OF LEFT LUNG: ICD-10-CM

## 2023-08-23 PROCEDURE — 3008F PR BODY MASS INDEX (BMI) DOCUMENTED: ICD-10-PCS | Mod: ,,, | Performed by: INTERNAL MEDICINE

## 2023-08-23 PROCEDURE — 3077F SYST BP >= 140 MM HG: CPT | Mod: ,,, | Performed by: INTERNAL MEDICINE

## 2023-08-23 PROCEDURE — 3008F BODY MASS INDEX DOCD: CPT | Mod: ,,, | Performed by: INTERNAL MEDICINE

## 2023-08-23 PROCEDURE — 99204 PR OFFICE/OUTPT VISIT, NEW, LEVL IV, 45-59 MIN: ICD-10-PCS | Mod: S$PBB,,, | Performed by: INTERNAL MEDICINE

## 2023-08-23 PROCEDURE — 99204 OFFICE O/P NEW MOD 45 MIN: CPT | Mod: S$PBB,,, | Performed by: INTERNAL MEDICINE

## 2023-08-23 PROCEDURE — 99214 OFFICE O/P EST MOD 30 MIN: CPT | Mod: PBBFAC | Performed by: INTERNAL MEDICINE

## 2023-08-23 PROCEDURE — 3077F PR MOST RECENT SYSTOLIC BLOOD PRESSURE >= 140 MM HG: ICD-10-PCS | Mod: ,,, | Performed by: INTERNAL MEDICINE

## 2023-08-23 RX ORDER — AZITHROMYCIN 500 MG/1
500 TABLET, FILM COATED ORAL
COMMUNITY
Start: 2023-08-09 | End: 2023-09-06 | Stop reason: CLARIF

## 2023-08-23 RX ORDER — ALBUTEROL SULFATE 90 UG/1
2 AEROSOL, METERED RESPIRATORY (INHALATION) EVERY 4 HOURS PRN
COMMUNITY
Start: 2023-08-09 | End: 2023-08-30 | Stop reason: SDUPTHER

## 2023-08-23 RX ORDER — BENZONATATE 100 MG/1
100 CAPSULE ORAL 3 TIMES DAILY PRN
COMMUNITY
Start: 2023-08-09 | End: 2023-11-20

## 2023-08-23 NOTE — H&P (VIEW-ONLY)
"Subjective:       Patient ID: Viviane Seaman is a 48 y.o. female.    Chief Complaint: Establish Care (Referred by dr Chen for lung mass. States in 2015, had imaging which showed a "spot" on her lungs. )    Patient presents today with a asymptomatic lung mass      Past Medical History:   Diagnosis Date    Hypertension      Past Surgical History:   Procedure Laterality Date     SECTION      CHOLECYSTECTOMY      EYE SURGERY Left     HIP SURGERY Left     PLATE    JOINT REPLACEMENT Left     HIP    LEG SURGERY Left     TONSILLECTOMY       History reviewed. No pertinent family history.  Review of patient's allergies indicates:   Allergen Reactions    Cephalexin       Social History     Tobacco Use    Smoking status: Every Day     Current packs/day: 0.50     Types: Cigarettes    Smokeless tobacco: Never   Substance Use Topics    Alcohol use: Never    Drug use: Never      Review of Systems   Constitutional:  Negative for chills, activity change and night sweats.   HENT:  Negative for congestion and ear pain.    Eyes:  Negative for redness and itching.   Cardiovascular:  Negative for chest pain and palpitations.   Musculoskeletal:  Negative for arthralgias and back pain.   Skin:  Negative for rash.   Gastrointestinal:  Negative for abdominal pain and abdominal distention.   Neurological:  Negative for dizziness and headaches.   Hematological:  Negative for adenopathy. Does not bruise/bleed easily.   Psychiatric/Behavioral:  Negative for confusion. The patient is not nervous/anxious.        Objective:      Physical Exam   Constitutional: She is oriented to person, place, and time. She appears well-developed and well-nourished.   HENT:   Head: Normocephalic.   Nose: Nose normal.   Mouth/Throat: Oropharynx is clear and moist.   Neck: No JVD present. No thyromegaly present.   Cardiovascular: Normal rate, regular rhythm, normal heart sounds and intact distal pulses.   Pulmonary/Chest: Normal expansion, " "hyperinflation, symmetric chest wall expansion, effort normal and breath sounds normal.   Abdominal: Soft. Bowel sounds are normal.   Musculoskeletal:         General: Normal range of motion.      Cervical back: Normal range of motion and neck supple.   Lymphadenopathy: No supraclavicular adenopathy is present.     She has no cervical adenopathy.   Neurological: She is alert and oriented to person, place, and time. She has normal reflexes.   Skin: Skin is warm and dry.   Psychiatric: She has a normal mood and affect. Her behavior is normal.     Personal Diagnostic Review  none pertinent        8/23/2023     3:42 PM 6/7/2023     1:50 AM 6/7/2023    12:27 AM 6/6/2023    11:00 PM 6/6/2023    10:25 PM 6/6/2023    10:11 PM 6/6/2023    10:07 PM   Pulmonary Function Tests   SpO2 99 % 97 % 99 % 100 % 100 % 100 %    Height 5' 8" (1.727 m)  5' 8" (1.727 m)    5' 8" (1.727 m)   Weight 98.9 kg (218 lb)  98.9 kg (218 lb)    99.2 kg (218 lb 9.6 oz)   BMI (Calculated) 33.2  33.2    33.2         Assessment:       1. Mass of left lung        Outpatient Encounter Medications as of 8/23/2023   Medication Sig Dispense Refill    albuterol (PROVENTIL/VENTOLIN HFA) 90 mcg/actuation inhaler 2 puffs every 4 (four) hours as needed.      benzonatate (TESSALON) 100 MG capsule Take 100 mg by mouth 3 (three) times daily as needed.      aspirin (ECOTRIN) 81 MG EC tablet Take 81 mg by mouth once daily.      azithromycin (ZITHROMAX) 500 MG tablet Take 500 mg by mouth.      HYDROcodone-acetaminophen (NORCO) 7.5-325 mg per tablet Take 1 tablet by mouth every 6 (six) hours as needed for Pain. (Patient not taking: Reported on 8/23/2023) 12 tablet 0    ondansetron (ZOFRAN) 4 MG tablet Take 1 tablet (4 mg total) by mouth every 6 (six) hours. (Patient not taking: Reported on 8/23/2023) 12 tablet 0     No facility-administered encounter medications on file as of 8/23/2023.     No orders of the defined types were placed in this encounter.      Plan:     "   Problem List Items Addressed This Visit          Pulmonary    Mass of left lung     Patient is sent today for evaluation of a left lung mass with mediastinal adenopathy in his smoker.  I have discussed the case with  who will set the patient up for endobronchial ultrasound bronchoscopy.  Patient will follow up me in 3 weeks.  Informed consent obtained differential diagnosis discussed

## 2023-08-23 NOTE — ASSESSMENT & PLAN NOTE
Patient is sent today for evaluation of a left lung mass with mediastinal adenopathy in his smoker.  I have discussed the case with  who will set the patient up for endobronchial ultrasound bronchoscopy.  Patient will follow up me in 3 weeks.  Informed consent obtained differential diagnosis discussed

## 2023-08-30 ENCOUNTER — TELEPHONE (OUTPATIENT)
Dept: PULMONOLOGY | Facility: CLINIC | Age: 48
End: 2023-08-30
Payer: MEDICAID

## 2023-08-30 DIAGNOSIS — R91.8 MASS OF LEFT LUNG: Primary | ICD-10-CM

## 2023-08-30 DIAGNOSIS — E87.1 HYPONATREMIA: ICD-10-CM

## 2023-08-30 RX ORDER — HYDROCODONE BITARTRATE AND ACETAMINOPHEN 7.5; 325 MG/1; MG/1
1 TABLET ORAL EVERY 6 HOURS PRN
Qty: 12 TABLET | Refills: 0 | Status: CANCELLED | OUTPATIENT
Start: 2023-08-30

## 2023-08-30 RX ORDER — ALBUTEROL SULFATE 90 UG/1
2 AEROSOL, METERED RESPIRATORY (INHALATION) EVERY 4 HOURS PRN
Qty: 18 G | Refills: 5 | Status: ON HOLD | OUTPATIENT
Start: 2023-08-30 | End: 2023-11-26 | Stop reason: SDUPTHER

## 2023-08-30 RX ORDER — ONDANSETRON 4 MG/1
4 TABLET, FILM COATED ORAL EVERY 6 HOURS
Qty: 12 TABLET | Refills: 0 | Status: CANCELLED | OUTPATIENT
Start: 2023-08-30

## 2023-08-30 NOTE — TELEPHONE ENCOUNTER
----- Message from Viviane Jarquin sent at 8/29/2023  2:32 PM CDT -----  Patient sister called stating she need something for nausea. Please give her a call at ph# [phone].

## 2023-08-30 NOTE — TELEPHONE ENCOUNTER
Called patient to discuss diagnostic procedure for LLL mass.     She had excellent understanding of CT findings including LLL mass and associated lymphadenopathy. She is interested in diagnosis with goal to pursue treatment.     Discussed Bronchoscopy w/ EBUS procedure including procedural risks. She expressed understanding.     Plan for Bronch w/ EBUS, TBNA and TBBx tentatively scheduled for 09/06/2023.     Will obtain BMP prior to procedure.     Mass of left lung  -     EBUS; Future; Expected date: 08/30/2023    Hyponatremia  -     Basic Metabolic Panel; Future; Expected date: 08/30/2023

## 2023-08-30 NOTE — TELEPHONE ENCOUNTER
Called pt back, she was requesting nausea and pain medications and questions about scheduleding for an procedure.     Informed pt that  consulted with another physician  about performing an EBUS on her which is an special kind of bronchoscopy not an endoscopy and that she will contact her with all the informations once we are able to get the procedure set up in [place]. She voiced understanding.     Also, informed her that I will send an request to  for medications and will inform her if he approved it or not. She voiced understanding. Will resend albuterol to pharmacy as well because she doesn't know if she has refills or not.

## 2023-09-06 ENCOUNTER — HOSPITAL ENCOUNTER (EMERGENCY)
Facility: HOSPITAL | Age: 48
Discharge: HOME | End: 2023-09-06
Attending: INTERNAL MEDICINE
Payer: MEDICAID

## 2023-09-06 VITALS
BODY MASS INDEX: 29.01 KG/M2 | WEIGHT: 191.38 LBS | DIASTOLIC BLOOD PRESSURE: 99 MMHG | OXYGEN SATURATION: 98 % | TEMPERATURE: 99 F | RESPIRATION RATE: 16 BRPM | HEIGHT: 68 IN | SYSTOLIC BLOOD PRESSURE: 160 MMHG | HEART RATE: 85 BPM

## 2023-09-06 DIAGNOSIS — R11.2 NAUSEA & VOMITING: Primary | ICD-10-CM

## 2023-09-06 DIAGNOSIS — R91.8 MASS OF LEFT LUNG: ICD-10-CM

## 2023-09-06 LAB
AMPHET UR QL SCN: POSITIVE
ANION GAP SERPL CALCULATED.3IONS-SCNC: 10 MMOL/L (ref 7–16)
BACTERIA #/AREA URNS HPF: ABNORMAL /HPF
BARBITURATES UR QL SCN: NEGATIVE
BENZODIAZ METAB UR QL SCN: NEGATIVE
BILIRUB UR QL STRIP: NEGATIVE
BUN SERPL-MCNC: 20 MG/DL (ref 7–18)
BUN/CREAT SERPL: 28 (ref 6–20)
CALCIUM SERPL-MCNC: 9.4 MG/DL (ref 8.5–10.1)
CANNABINOIDS UR QL SCN: NEGATIVE
CHLORIDE SERPL-SCNC: 100 MMOL/L (ref 98–107)
CLARITY UR: ABNORMAL
CO2 SERPL-SCNC: 32 MMOL/L (ref 21–32)
COCAINE UR QL SCN: NEGATIVE
COLOR UR: YELLOW
CREAT SERPL-MCNC: 0.72 MG/DL (ref 0.55–1.02)
EGFR (NO RACE VARIABLE) (RUSH/TITUS): 102 ML/MIN/1.73M2
EOSINOPHIL NFR BLD MANUAL: 4 % (ref 1–4)
ERYTHROCYTE [DISTWIDTH] IN BLOOD BY AUTOMATED COUNT: 15.2 % (ref 11.5–14.5)
GLUCOSE SERPL-MCNC: 102 MG/DL (ref 74–106)
GLUCOSE UR STRIP-MCNC: NEGATIVE MG/DL
HCT VFR BLD AUTO: 41.3 % (ref 38–47)
HGB BLD-MCNC: 13.4 G/DL (ref 12–16)
KETONES UR STRIP-SCNC: ABNORMAL MG/DL
LEUKOCYTE ESTERASE UR QL STRIP: NEGATIVE
LYMPHOCYTES NFR BLD MANUAL: 10 % (ref 27–41)
MCH RBC QN AUTO: 27.8 PG (ref 27–31)
MCHC RBC AUTO-ENTMCNC: 32.4 G/DL (ref 32–36)
MCV RBC AUTO: 85.7 FL (ref 80–96)
MONOCYTES NFR BLD MANUAL: 9 % (ref 2–6)
MPC BLD CALC-MCNC: 10.3 FL (ref 9.4–12.4)
MUCOUS THREADS #/AREA URNS HPF: ABNORMAL /HPF
NEUTS SEG NFR BLD MANUAL: 77 % (ref 50–62)
NITRITE UR QL STRIP: NEGATIVE
NRBC BLD MANUAL-RTO: ABNORMAL %
OPIATES UR QL SCN: NEGATIVE
PCP UR QL SCN: NEGATIVE
PH UR STRIP: 6.5 PH UNITS
PLATELET # BLD AUTO: 390 K/UL (ref 150–400)
PLATELET MORPHOLOGY: NORMAL
POTASSIUM SERPL-SCNC: 4.1 MMOL/L (ref 3.5–5.1)
PROT UR QL STRIP: 30
RBC # BLD AUTO: 4.82 M/UL (ref 4.2–5.4)
RBC # UR STRIP: ABNORMAL /UL
RBC #/AREA URNS HPF: ABNORMAL /HPF
RBC MORPH BLD: NORMAL
SARS-COV-2 RDRP RESP QL NAA+PROBE: NEGATIVE
SODIUM SERPL-SCNC: 138 MMOL/L (ref 136–145)
SP GR UR STRIP: 1.02
SQUAMOUS #/AREA URNS LPF: ABNORMAL /LPF
TROPONIN I SERPL DL<=0.01 NG/ML-MCNC: 4 PG/ML
UROBILINOGEN UR STRIP-ACNC: 2 MG/DL
WBC # BLD AUTO: 9.4 K/UL (ref 4.5–11)
WBC #/AREA URNS HPF: ABNORMAL /HPF

## 2023-09-06 PROCEDURE — 93010 ELECTROCARDIOGRAM REPORT: CPT | Mod: ,,, | Performed by: INTERNAL MEDICINE

## 2023-09-06 PROCEDURE — 87635 SARS-COV-2 COVID-19 AMP PRB: CPT | Performed by: INTERNAL MEDICINE

## 2023-09-06 PROCEDURE — 99284 PR EMERGENCY DEPT VISIT,LEVEL IV: ICD-10-PCS | Mod: ,,, | Performed by: EMERGENCY MEDICINE

## 2023-09-06 PROCEDURE — 99285 EMERGENCY DEPT VISIT HI MDM: CPT | Mod: 25

## 2023-09-06 PROCEDURE — 99284 EMERGENCY DEPT VISIT MOD MDM: CPT | Mod: ,,, | Performed by: EMERGENCY MEDICINE

## 2023-09-06 PROCEDURE — 81001 URINALYSIS AUTO W/SCOPE: CPT | Mod: 59 | Performed by: INTERNAL MEDICINE

## 2023-09-06 PROCEDURE — 80307 DRUG TEST PRSMV CHEM ANLYZR: CPT | Performed by: INTERNAL MEDICINE

## 2023-09-06 PROCEDURE — 84484 ASSAY OF TROPONIN QUANT: CPT | Performed by: INTERNAL MEDICINE

## 2023-09-06 PROCEDURE — 85025 COMPLETE CBC W/AUTO DIFF WBC: CPT | Performed by: INTERNAL MEDICINE

## 2023-09-06 PROCEDURE — 63600175 PHARM REV CODE 636 W HCPCS: Performed by: INTERNAL MEDICINE

## 2023-09-06 PROCEDURE — 87077 CULTURE AEROBIC IDENTIFY: CPT | Performed by: INTERNAL MEDICINE

## 2023-09-06 PROCEDURE — 80048 BASIC METABOLIC PNL TOTAL CA: CPT | Performed by: INTERNAL MEDICINE

## 2023-09-06 PROCEDURE — 63600175 PHARM REV CODE 636 W HCPCS: Performed by: EMERGENCY MEDICINE

## 2023-09-06 PROCEDURE — 96361 HYDRATE IV INFUSION ADD-ON: CPT

## 2023-09-06 PROCEDURE — 93005 ELECTROCARDIOGRAM TRACING: CPT

## 2023-09-06 PROCEDURE — 96374 THER/PROPH/DIAG INJ IV PUSH: CPT

## 2023-09-06 PROCEDURE — 93010 PR ELECTROCARDIOGRAM REPORT: ICD-10-PCS | Mod: ,,, | Performed by: INTERNAL MEDICINE

## 2023-09-06 PROCEDURE — 96375 TX/PRO/DX INJ NEW DRUG ADDON: CPT

## 2023-09-06 PROCEDURE — 25000003 PHARM REV CODE 250: Performed by: EMERGENCY MEDICINE

## 2023-09-06 RX ORDER — PROCHLORPERAZINE EDISYLATE 5 MG/ML
10 INJECTION INTRAMUSCULAR; INTRAVENOUS
Status: COMPLETED | OUTPATIENT
Start: 2023-09-06 | End: 2023-09-06

## 2023-09-06 RX ORDER — ACETAMINOPHEN 500 MG
1000 TABLET ORAL
Status: COMPLETED | OUTPATIENT
Start: 2023-09-06 | End: 2023-09-06

## 2023-09-06 RX ORDER — ONDANSETRON 4 MG/1
4 TABLET, ORALLY DISINTEGRATING ORAL EVERY 6 HOURS PRN
Qty: 12 TABLET | Refills: 0 | Status: SHIPPED | OUTPATIENT
Start: 2023-09-06 | End: 2023-09-09

## 2023-09-06 RX ORDER — ONDANSETRON 2 MG/ML
4 INJECTION INTRAMUSCULAR; INTRAVENOUS
Status: COMPLETED | OUTPATIENT
Start: 2023-09-06 | End: 2023-09-06

## 2023-09-06 RX ADMIN — SODIUM CHLORIDE 1000 ML: 9 INJECTION, SOLUTION INTRAVENOUS at 08:09

## 2023-09-06 RX ADMIN — ONDANSETRON 4 MG: 2 INJECTION INTRAMUSCULAR; INTRAVENOUS at 06:09

## 2023-09-06 RX ADMIN — PROCHLORPERAZINE EDISYLATE 10 MG: 5 INJECTION INTRAMUSCULAR; INTRAVENOUS at 09:09

## 2023-09-06 RX ADMIN — ACETAMINOPHEN 1000 MG: 500 TABLET ORAL at 09:09

## 2023-09-06 NOTE — ED PROVIDER NOTES
Encounter Date: 2023       History     Chief Complaint   Patient presents with    Vomiting    Generalized Body Aches     Patient comes in complaining nausea and vomiting since midnight tonight.  Denies any fever chills.  States his generalized headache, some chest pain, but no generalized a logical complaints except headache, no incontinence urine or bowel, this route else's or loss of consciousness.    Was scheduled for lung biopsy in the left lower lung mass today at [facility] but stated was rescheduled to next week.  Patient was hospitalized last month with left pneumonia but signed out AMA.  She was seen approximately 3 weeks ago at [facility] leg pain with a negative ultrasound for DVT.  She has extensive history of multiple medical complaints including chronic pain, esophageal mass, nausea vomiting, and chest pain.        Review of patient's allergies indicates:   Allergen Reactions    Cephalexin Swelling     Past Medical History:   Diagnosis Date    Hypertension      Past Surgical History:   Procedure Laterality Date     SECTION      CHOLECYSTECTOMY      DILATION AND CURETTAGE OF UTERUS      EYE SURGERY Left     HIP SURGERY Left     PLATE    JOINT REPLACEMENT Left     HIP    LEG SURGERY Left     TONSILLECTOMY       History reviewed. No pertinent family history.  Social History     Tobacco Use    Smoking status: Every Day     Current packs/day: 0.50     Types: Cigarettes    Smokeless tobacco: Never   Substance Use Topics    Alcohol use: Never    Drug use: Never     Review of Systems   Constitutional:  Negative for fever.   HENT:  Negative for sore throat.    Respiratory:  Negative for shortness of breath.    Cardiovascular:  Negative for chest pain.   Gastrointestinal:  Negative for nausea.   Genitourinary:  Negative for dysuria.   Musculoskeletal:  Negative for back pain.   Skin:  Negative for rash.   Neurological:  Negative for weakness.   Hematological:  Does not bruise/bleed easily.        Physical Exam     Initial Vitals [09/06/23 0616]   BP Pulse Resp Temp SpO2   (!) 172/98 88 18 98.5 °F (36.9 °C) 98 %      MAP       --         Physical Exam    Vitals reviewed.  Constitutional: She appears well-developed.   Eyes: Pupils are equal, round, and reactive to light.   Neck:   Normal range of motion.  Cardiovascular:  Normal rate, regular rhythm, normal heart sounds and normal pulses.           Pulmonary/Chest: Effort normal and breath sounds normal.   Abdominal: Abdomen is soft and flat. Bowel sounds are normal. There is no abdominal tenderness.   Musculoskeletal:         General: Normal range of motion.      Cervical back: Normal range of motion.     Neurological: She is alert. She has normal strength and normal reflexes. No cranial nerve deficit or sensory deficit. GCS eye subscore is 4. GCS verbal subscore is 5. GCS motor subscore is 6.   Skin: Skin is warm.   Psychiatric: She has a normal mood and affect.         Medical Screening Exam   See Full Note    ED Course   Procedures  Labs Reviewed   BASIC METABOLIC PANEL - Abnormal; Notable for the following components:       Result Value    BUN 20 (*)     BUN/Creatinine Ratio 28 (*)     All other components within normal limits   CBC WITH DIFFERENTIAL - Abnormal; Notable for the following components:    RDW 15.2 (*)     All other components within normal limits   URINALYSIS, REFLEX TO URINE CULTURE - Abnormal; Notable for the following components:    Protein, UA 30 (*)     Urobilinogen, UA 2.0 (*)     Blood, UA Trace-Intact (*)     All other components within normal limits   DRUG SCREEN, URINE (BEAKER) - Abnormal; Notable for the following components:    Amphetamine, Urine Positive (*)     All other components within normal limits   URINALYSIS, MICROSCOPIC - Abnormal; Notable for the following components:    Bacteria, UA Many (*)     Squamous Epithelial Cells, UA Few (*)     Mucus, UA Moderate (*)     All other components within normal limits    MANUAL DIFFERENTIAL - Abnormal; Notable for the following components:    Segmented Neutrophils, Man % 77 (*)     Lymphocytes, Man % 10 (*)     Monocytes, Man % 9 (*)     All other components within normal limits   TROPONIN I - Normal   SARS-COV-2 RNA AMPLIFICATION, QUAL - Normal    Narrative:     Negative SARS-CoV results should not be used as the sole basis for treatment or patient management decisions; negative results should be considered in the context of a patient's recent exposures, history and the presene of clinical signs and symptoms consistent with COVID-19.  Negative results should be treated as presumptive and confirmed by molecular assay, if necessary for patient management.   CULTURE, URINE   CBC W/ AUTO DIFFERENTIAL    Narrative:     The following orders were created for panel order CBC auto differential.  Procedure                               Abnormality         Status                     ---------                               -----------         ------                     CBC with Differential[[id number]]       Abnormal            Final result               Manual Differential[[id number]]         Abnormal            Final result                 Please view results for these tests on the individual orders.     EKG Readings: (Independently Interpreted)   Initial Reading: No STEMI. Previous EKG: Compared with most recent EKG Previous EKG Date: 05/29/2023. Rhythm: Normal Sinus Rhythm. Heart Rate: 88. Ectopy: No Ectopy. Conduction: Normal. ST Segments: Normal ST Segments. T Waves: Normal. Axis: Normal. Clinical Impression: Normal Sinus Rhythm   Normal sinus rhythm heart rate 88 and no acute ischemic changes       Imaging Results              XR ABDOMEN, ACUTE 2 OR MORE VIEWS WITH CHEST (Final result)  Result time 09/06/23 08:00:32      Final result by Jayden Santiago DO (09/06/23 08:00:32)                   Impression:      Persistent left lower lung atelectasis/infiltrate suspicious for pneumonia.   Consider chest CT.  Nonspecific nonobstructive bowel gas pattern.    Point of Service: [facility]      Electronically signed by: Jayden Santiago  Date:    09/06/2023  Time:    08:00               Narrative:    EXAMINATION:  XR ABDOMEN ACUTE 2 OR MORE VIEWS WITH CHEST    CLINICAL HISTORY:  Nausea vomiting;    COMPARISON:  Chest x-ray May 29, 2023    TECHNIQUE:  Single frontal view of the chest as well as frontal views of the abdomen in the supine and upright  position.    FINDINGS:  Persistent left lower lung atelectasis/infiltrate suspicious for pneumonia.  Consider chest CT.  Nonspecific nonobstructive bowel gas pattern.  No free intraperitoneal air demonstrated.  Total left hip arthroplasty. Surgical clips project over the right upper quadrant of the abdomen.                                       Medications   sodium chloride 0.9% bolus 1,000 mL 1,000 mL (1,000 mLs Intravenous New Bag 9/6/23 0823)   prochlorperazine injection Soln 10 mg (has no administration in time range)   ondansetron injection 4 mg (4 mg Intravenous Given 9/6/23 0649)     Medical Decision Making  Patient comes in with nausea vomiting x3 this morning a with generalized body aches headache upper respiratory symptoms.  Rule out about illness including COVID, cardiac ischemia, electrolyte imbalance, pneumonia, abdominal blockage or ileus.    Patient signed off to Dr. Arana for final evaluation, diagnosis and disposition.    Patient was given additional IV fluid, and prochlorperazine IV for nausea.  She has not had any further vomiting since arriving to the emergency department.  We discussed at length need for follow-up for left lower lung mass biopsy.  Patient was to see Dr. Aguirre for biopsy, however, that is in [place] and [place] Medicaid will not cover so she needs a referral to see a pulmonologist in the state of [place] that [place] medicate.    Amount and/or Complexity of Data Reviewed  Labs: ordered.  Decision-making details documented in ED Course.  Radiology: ordered. Decision-making details documented in ED Course.  Discussion of management or test interpretation with external provider(s): I spoke with patient's primary physician, Dr. Ball, by phone, he was made aware of patient's situation and diagnosis of left lower lung mass.  He will have his front office staff contact patient for an appointment soon as possible and also requested that we tell the patient to call the office for an appointment as soon as possible for her to obtain a referral to have lung mass biopsy done.    Risk  Prescription drug management.               ED Course as of 09/06/23 0921   Wed Sep 06, 2023   0715 Drug Screen, Urine(!)  Urine drug screen is positive for amphetamines. [LM]   0716 CBC auto differential(!)  CBC is unremarkable [LM]   0717 Urinalysis, Microscopic(!)  Urinalysis shows no WBCs 0-3 RBCs, many bacteria, and a few squamous epithelial cells, on a clean-catch specimen. [LM]   0717 Basic metabolic panel(!)  Basic chemistry panel shows increased BUN of 20, normal creatinine, with an increased BUN creatinine ratio of 28. [LM]   0816 XR ABDOMEN, ACUTE 2 OR MORE VIEWS WITH CHEST [LM]   0816 XR ABDOMEN, ACUTE 2 OR MORE VIEWS WITH CHEST  Review of radiologist's report for chest x-ray indicates persistent left lower lung field infiltrate suspicious for pneumonia.  CT scan of chest suggested.  Review of previous radiology studies indicate patient had a chest CT already and has a soft tissue mass in that area and is scheduled for a biopsy with her pulmonologist. [LM]      ED Course User Index  [LM] Malcom Arana DO                    Clinical Impression:   Final diagnoses:  [R11.2] Nausea & vomiting (Primary)  [R91.8] Mass of left lung - Awaiting biopsy, needs a referral from primary physician        ED Disposition Condition    Discharge Stable          ED Prescriptions       Medication Sig Dispense Start Date End Date  Auth. Provider    ondansetron (ZOFRAN-ODT) 4 MG TbDL Take 1 tablet (4 mg total) by mouth every 6 (six) hours as needed (Nausea or vomiting). 12 tablet 9/6/2023 9/9/2023 Malcom Arana DO          Follow-up Information       Follow up With Specialties Details Why Contact Info    Justin Bolton MD Family Medicine Schedule an appointment as soon as possible for a visit in 1 day To obtain referral to a pulmonologist in [place] for lung mass biopsy. [address]  [address]  [facility] [id number] [phone]               Malcom Arana DO  09/06/23 0923

## 2023-09-06 NOTE — ED NOTES
Call placed to dr tejada office-spoke with aury-patient will not be able to have further treatment here due to her insurance coverage-attempting to make referal at this time-[facility]dr krishnamurthy spoke with dr steele for further assistance

## 2023-09-06 NOTE — ED TRIAGE NOTES
Pt reports nausea for 4 days and vomited x3 in past 24 hours with generalized body aches.  Pt reports has been diagnosed with mass on her lung awaiting biopsy for same.

## 2023-09-07 ENCOUNTER — PATIENT MESSAGE (OUTPATIENT)
Dept: INTERNAL MEDICINE | Facility: CLINIC | Age: 48
End: 2023-09-07
Payer: MEDICAID

## 2023-09-07 ENCOUNTER — TELEPHONE (OUTPATIENT)
Dept: PULMONOLOGY | Facility: CLINIC | Age: 48
End: 2023-09-07
Payer: MEDICAID

## 2023-09-07 NOTE — TELEPHONE ENCOUNTER
Spoke with the patient to confirm her EBUS procedure on 9/11 which she is in agreement with plan. Will arrive in GI Lab at 7@am. Pre-op instructions sent to her patient portal [employer] per request.

## 2023-09-10 LAB — UA COMPLETE W REFLEX CULTURE PNL UR: ABNORMAL

## 2023-09-11 ENCOUNTER — HOSPITAL ENCOUNTER (OUTPATIENT)
Dept: RADIOLOGY | Facility: HOSPITAL | Age: 48
Discharge: HOME | End: 2023-09-11
Attending: STUDENT IN AN ORGANIZED HEALTH CARE EDUCATION/TRAINING PROGRAM
Payer: MEDICAID

## 2023-09-11 ENCOUNTER — ANESTHESIA EVENT (OUTPATIENT)
Dept: GASTROENTEROLOGY | Facility: HOSPITAL | Age: 48
End: 2023-09-11
Payer: MEDICAID

## 2023-09-11 ENCOUNTER — HOSPITAL ENCOUNTER (OUTPATIENT)
Dept: GASTROENTEROLOGY | Facility: HOSPITAL | Age: 48
Discharge: HOME | End: 2023-09-11
Attending: STUDENT IN AN ORGANIZED HEALTH CARE EDUCATION/TRAINING PROGRAM
Payer: MEDICAID

## 2023-09-11 ENCOUNTER — ANESTHESIA (OUTPATIENT)
Dept: GASTROENTEROLOGY | Facility: HOSPITAL | Age: 48
End: 2023-09-11
Payer: MEDICAID

## 2023-09-11 VITALS
WEIGHT: 191 LBS | OXYGEN SATURATION: 99 % | SYSTOLIC BLOOD PRESSURE: 139 MMHG | HEIGHT: 68 IN | TEMPERATURE: 99 F | HEART RATE: 94 BPM | DIASTOLIC BLOOD PRESSURE: 78 MMHG | RESPIRATION RATE: 17 BRPM | BODY MASS INDEX: 28.95 KG/M2

## 2023-09-11 DIAGNOSIS — R59.0 MEDIASTINAL LYMPHADENOPATHY: Primary | ICD-10-CM

## 2023-09-11 DIAGNOSIS — R91.8 MASS OF LEFT LUNG: ICD-10-CM

## 2023-09-11 PROCEDURE — 88342 IMHCHEM/IMCYTCHM 1ST ANTB: CPT | Mod: 26,,, | Performed by: PATHOLOGY

## 2023-09-11 PROCEDURE — 88305 TISSUE EXAM BY PATHOLOGIST: CPT | Mod: TC,SUR | Performed by: STUDENT IN AN ORGANIZED HEALTH CARE EDUCATION/TRAINING PROGRAM

## 2023-09-11 PROCEDURE — 88342 SURGICAL PATHOLOGY: ICD-10-PCS | Mod: 26,,, | Performed by: PATHOLOGY

## 2023-09-11 PROCEDURE — 31653 BRONCH EBUS SAMPLNG 3/> NODE: CPT

## 2023-09-11 PROCEDURE — 87070 CULTURE OTHR SPECIMN AEROBIC: CPT | Performed by: STUDENT IN AN ORGANIZED HEALTH CARE EDUCATION/TRAINING PROGRAM

## 2023-09-11 PROCEDURE — 25000003 PHARM REV CODE 250: Performed by: STUDENT IN AN ORGANIZED HEALTH CARE EDUCATION/TRAINING PROGRAM

## 2023-09-11 PROCEDURE — 88172 CYTOLOGY, FNA: ICD-10-PCS | Mod: 26,,, | Performed by: PATHOLOGY

## 2023-09-11 PROCEDURE — 88363 SURGICAL PATHOLOGY: ICD-10-PCS | Mod: ,,, | Performed by: PATHOLOGY

## 2023-09-11 PROCEDURE — 71045 XR CHEST AP PORTABLE: ICD-10-PCS | Mod: 26,,, | Performed by: RADIOLOGY

## 2023-09-11 PROCEDURE — 31654 PR BRONCH W/ EBUS, DIAG OR THERA INTERVENTION PERIPHERAL LESION(S), INCL GUID, ADD ON CODE: ICD-10-PCS | Mod: ,,, | Performed by: STUDENT IN AN ORGANIZED HEALTH CARE EDUCATION/TRAINING PROGRAM

## 2023-09-11 PROCEDURE — 27201423 OPTIME MED/SURG SUP & DEVICES STERILE SUPPLY

## 2023-09-11 PROCEDURE — 88342 CYTOLOGY, FNA: ICD-10-PCS | Mod: 26,,, | Performed by: PATHOLOGY

## 2023-09-11 PROCEDURE — 31628 BRONCHOSCOPY/LUNG BX EACH: CPT | Mod: 51,LT,, | Performed by: STUDENT IN AN ORGANIZED HEALTH CARE EDUCATION/TRAINING PROGRAM

## 2023-09-11 PROCEDURE — D9220A PRA ANESTHESIA: Mod: ANES,,, | Performed by: ANESTHESIOLOGY

## 2023-09-11 PROCEDURE — 88341 IMHCHEM/IMCYTCHM EA ADD ANTB: CPT | Mod: 26,,, | Performed by: PATHOLOGY

## 2023-09-11 PROCEDURE — 31632 PR BRONCHOSCOPY/LUNG BX, ADD'L: ICD-10-PCS | Mod: 59,LT,, | Performed by: STUDENT IN AN ORGANIZED HEALTH CARE EDUCATION/TRAINING PROGRAM

## 2023-09-11 PROCEDURE — 37000009 HC ANESTHESIA EA ADD 15 MINS

## 2023-09-11 PROCEDURE — 71045 X-RAY EXAM CHEST 1 VIEW: CPT | Mod: 26,,, | Performed by: RADIOLOGY

## 2023-09-11 PROCEDURE — 27000510 HC BLANKET BAIR HUGGER ANY SIZE: Performed by: ANESTHESIOLOGY

## 2023-09-11 PROCEDURE — 31653 BRONCH EBUS SAMPLNG 3/> NODE: CPT | Mod: ,,, | Performed by: STUDENT IN AN ORGANIZED HEALTH CARE EDUCATION/TRAINING PROGRAM

## 2023-09-11 PROCEDURE — 27202344 HC EYESHIELD: Performed by: ANESTHESIOLOGY

## 2023-09-11 PROCEDURE — 88363 XM ARCHIVE TISSUE MOLEC ANAL: CPT | Mod: ,,, | Performed by: PATHOLOGY

## 2023-09-11 PROCEDURE — 27000165 HC TUBE, ETT CUFFED: Performed by: ANESTHESIOLOGY

## 2023-09-11 PROCEDURE — 27000716 HC OXISENSOR PROBE, ANY SIZE: Performed by: ANESTHESIOLOGY

## 2023-09-11 PROCEDURE — 88341 CYTOLOGY, FNA: ICD-10-PCS | Mod: 26,,, | Performed by: PATHOLOGY

## 2023-09-11 PROCEDURE — 31629 PR BRONCHOSCOPY,TRANSBRON ASPIR BX: ICD-10-PCS | Mod: 59,LT,, | Performed by: STUDENT IN AN ORGANIZED HEALTH CARE EDUCATION/TRAINING PROGRAM

## 2023-09-11 PROCEDURE — 31632 BRONCHOSCOPY/LUNG BX ADDL: CPT | Mod: 59,LT,, | Performed by: STUDENT IN AN ORGANIZED HEALTH CARE EDUCATION/TRAINING PROGRAM

## 2023-09-11 PROCEDURE — 31625 BRONCHOSCOPY W/BIOPSY(S): CPT | Mod: 59

## 2023-09-11 PROCEDURE — 25000003 PHARM REV CODE 250: Performed by: NURSE ANESTHETIST, CERTIFIED REGISTERED

## 2023-09-11 PROCEDURE — 31653 PR BRONCH W/ EBUS, SAMPLING 3 OR MORE NODES, INCL GUIDE: ICD-10-PCS | Mod: ,,, | Performed by: STUDENT IN AN ORGANIZED HEALTH CARE EDUCATION/TRAINING PROGRAM

## 2023-09-11 PROCEDURE — 71045 X-RAY EXAM CHEST 1 VIEW: CPT | Mod: TC

## 2023-09-11 PROCEDURE — 31622 DX BRONCHOSCOPE/WASH: CPT

## 2023-09-11 PROCEDURE — 27000655: Performed by: ANESTHESIOLOGY

## 2023-09-11 PROCEDURE — 31628 PR BRONCHOSCOPY,TRANSBRONCH BIOPSY: ICD-10-PCS | Mod: 51,LT,, | Performed by: STUDENT IN AN ORGANIZED HEALTH CARE EDUCATION/TRAINING PROGRAM

## 2023-09-11 PROCEDURE — D9220A PRA ANESTHESIA: Mod: CRNA,,, | Performed by: NURSE ANESTHETIST, CERTIFIED REGISTERED

## 2023-09-11 PROCEDURE — 31654 BRONCH EBUS IVNTJ PERPH LES: CPT | Mod: ,,, | Performed by: STUDENT IN AN ORGANIZED HEALTH CARE EDUCATION/TRAINING PROGRAM

## 2023-09-11 PROCEDURE — 88172 CYTP DX EVAL FNA 1ST EA SITE: CPT | Mod: 26,,, | Performed by: PATHOLOGY

## 2023-09-11 PROCEDURE — 88305 TISSUE EXAM BY PATHOLOGIST: CPT | Mod: 26,,, | Performed by: PATHOLOGY

## 2023-09-11 PROCEDURE — 31629 BRONCHOSCOPY/NEEDLE BX EACH: CPT | Mod: 59,LT,, | Performed by: STUDENT IN AN ORGANIZED HEALTH CARE EDUCATION/TRAINING PROGRAM

## 2023-09-11 PROCEDURE — D9220A PRA ANESTHESIA: ICD-10-PCS | Mod: CRNA,,, | Performed by: NURSE ANESTHETIST, CERTIFIED REGISTERED

## 2023-09-11 PROCEDURE — 88305 CYTOLOGY, FNA: ICD-10-PCS | Mod: 26,,, | Performed by: PATHOLOGY

## 2023-09-11 PROCEDURE — 88341 SURGICAL PATHOLOGY: ICD-10-PCS | Mod: 26,,, | Performed by: PATHOLOGY

## 2023-09-11 PROCEDURE — 31624 DX BRONCHOSCOPE/LAVAGE: CPT

## 2023-09-11 PROCEDURE — 88305 SURGICAL PATHOLOGY: ICD-10-PCS | Mod: 26,,, | Performed by: PATHOLOGY

## 2023-09-11 PROCEDURE — 37000008 HC ANESTHESIA 1ST 15 MINUTES

## 2023-09-11 PROCEDURE — 63600175 PHARM REV CODE 636 W HCPCS: Performed by: NURSE ANESTHETIST, CERTIFIED REGISTERED

## 2023-09-11 PROCEDURE — D9220A PRA ANESTHESIA: ICD-10-PCS | Mod: ANES,,, | Performed by: ANESTHESIOLOGY

## 2023-09-11 PROCEDURE — 31628 BRONCHOSCOPY/LUNG BX EACH: CPT | Mod: LT

## 2023-09-11 PROCEDURE — 88305 TISSUE EXAM BY PATHOLOGIST: CPT | Mod: TC,MCY | Performed by: STUDENT IN AN ORGANIZED HEALTH CARE EDUCATION/TRAINING PROGRAM

## 2023-09-11 PROCEDURE — 27000689 HC BLADE LARYNGOSCOPE ANY SIZE: Performed by: ANESTHESIOLOGY

## 2023-09-11 RX ORDER — DEXAMETHASONE SODIUM PHOSPHATE 4 MG/ML
INJECTION, SOLUTION INTRA-ARTICULAR; INTRALESIONAL; INTRAMUSCULAR; INTRAVENOUS; SOFT TISSUE
Status: DISCONTINUED | OUTPATIENT
Start: 2023-09-11 | End: 2023-09-11

## 2023-09-11 RX ORDER — PROPOFOL 10 MG/ML
VIAL (ML) INTRAVENOUS
Status: DISCONTINUED | OUTPATIENT
Start: 2023-09-11 | End: 2023-09-11

## 2023-09-11 RX ORDER — MORPHINE SULFATE 10 MG/ML
4 INJECTION INTRAMUSCULAR; INTRAVENOUS; SUBCUTANEOUS EVERY 5 MIN PRN
Status: DISCONTINUED | OUTPATIENT
Start: 2023-09-11 | End: 2023-09-12 | Stop reason: HOSPADM

## 2023-09-11 RX ORDER — ROCURONIUM BROMIDE 10 MG/ML
INJECTION, SOLUTION INTRAVENOUS
Status: DISCONTINUED | OUTPATIENT
Start: 2023-09-11 | End: 2023-09-11

## 2023-09-11 RX ORDER — HYDROMORPHONE HYDROCHLORIDE 2 MG/ML
0.5 INJECTION, SOLUTION INTRAMUSCULAR; INTRAVENOUS; SUBCUTANEOUS EVERY 5 MIN PRN
Status: DISCONTINUED | OUTPATIENT
Start: 2023-09-11 | End: 2023-09-12 | Stop reason: HOSPADM

## 2023-09-11 RX ORDER — PHENYLEPHRINE HYDROCHLORIDE 10 MG/ML
INJECTION INTRAVENOUS
Status: DISCONTINUED | OUTPATIENT
Start: 2023-09-11 | End: 2023-09-11

## 2023-09-11 RX ORDER — SODIUM CHLORIDE 9 MG/ML
INJECTION, SOLUTION INTRAVENOUS CONTINUOUS
Status: DISCONTINUED | OUTPATIENT
Start: 2023-09-11 | End: 2023-09-12 | Stop reason: HOSPADM

## 2023-09-11 RX ORDER — FENTANYL CITRATE 50 UG/ML
INJECTION, SOLUTION INTRAMUSCULAR; INTRAVENOUS
Status: DISCONTINUED | OUTPATIENT
Start: 2023-09-11 | End: 2023-09-11

## 2023-09-11 RX ORDER — GABAPENTIN 100 MG/1
100 CAPSULE ORAL 3 TIMES DAILY
Qty: 90 CAPSULE | Refills: 5 | Status: SHIPPED | OUTPATIENT
Start: 2023-09-12 | End: 2023-09-29

## 2023-09-11 RX ORDER — MEPERIDINE HYDROCHLORIDE 25 MG/ML
25 INJECTION INTRAMUSCULAR; INTRAVENOUS; SUBCUTANEOUS EVERY 10 MIN PRN
Status: DISCONTINUED | OUTPATIENT
Start: 2023-09-11 | End: 2023-09-11 | Stop reason: HOSPADM

## 2023-09-11 RX ORDER — DIPHENHYDRAMINE HYDROCHLORIDE 50 MG/ML
25 INJECTION INTRAMUSCULAR; INTRAVENOUS EVERY 6 HOURS PRN
Status: DISCONTINUED | OUTPATIENT
Start: 2023-09-11 | End: 2023-09-12 | Stop reason: HOSPADM

## 2023-09-11 RX ORDER — LIDOCAINE HYDROCHLORIDE 20 MG/ML
INJECTION, SOLUTION EPIDURAL; INFILTRATION; INTRACAUDAL; PERINEURAL
Status: DISCONTINUED | OUTPATIENT
Start: 2023-09-11 | End: 2023-09-11

## 2023-09-11 RX ORDER — ONDANSETRON 2 MG/ML
4 INJECTION INTRAMUSCULAR; INTRAVENOUS DAILY PRN
Status: DISCONTINUED | OUTPATIENT
Start: 2023-09-11 | End: 2023-09-12 | Stop reason: HOSPADM

## 2023-09-11 RX ORDER — MIDAZOLAM HYDROCHLORIDE 1 MG/ML
INJECTION INTRAMUSCULAR; INTRAVENOUS
Status: DISCONTINUED | OUTPATIENT
Start: 2023-09-11 | End: 2023-09-11

## 2023-09-11 RX ORDER — ONDANSETRON 2 MG/ML
INJECTION INTRAMUSCULAR; INTRAVENOUS
Status: DISCONTINUED | OUTPATIENT
Start: 2023-09-11 | End: 2023-09-11

## 2023-09-11 RX ADMIN — PHENYLEPHRINE HYDROCHLORIDE 200 MCG: 10 INJECTION INTRAVENOUS at 01:09

## 2023-09-11 RX ADMIN — ONDANSETRON 4 MG: 2 INJECTION INTRAMUSCULAR; INTRAVENOUS at 12:09

## 2023-09-11 RX ADMIN — SODIUM CHLORIDE: 9 INJECTION, SOLUTION INTRAVENOUS at 12:09

## 2023-09-11 RX ADMIN — PROPOFOL 150 MG: 10 INJECTION, EMULSION INTRAVENOUS at 12:09

## 2023-09-11 RX ADMIN — FENTANYL CITRATE 50 MCG: 50 INJECTION INTRAMUSCULAR; INTRAVENOUS at 12:09

## 2023-09-11 RX ADMIN — SUGAMMADEX 200 MG: 100 INJECTION, SOLUTION INTRAVENOUS at 03:09

## 2023-09-11 RX ADMIN — MIDAZOLAM HYDROCHLORIDE 2 MG: 1 INJECTION, SOLUTION INTRAMUSCULAR; INTRAVENOUS at 12:09

## 2023-09-11 RX ADMIN — LIDOCAINE HYDROCHLORIDE 100 MG: 20 INJECTION, SOLUTION INTRAVENOUS at 12:09

## 2023-09-11 RX ADMIN — PHENYLEPHRINE HYDROCHLORIDE 100 MCG: 10 INJECTION INTRAVENOUS at 02:09

## 2023-09-11 RX ADMIN — DEXAMETHASONE SODIUM PHOSPHATE 8 MG: 4 INJECTION, SOLUTION INTRAMUSCULAR; INTRAVENOUS at 12:09

## 2023-09-11 RX ADMIN — PHENYLEPHRINE HYDROCHLORIDE 100 MCG: 10 INJECTION INTRAVENOUS at 01:09

## 2023-09-11 RX ADMIN — FENTANYL CITRATE 50 MCG: 50 INJECTION INTRAMUSCULAR; INTRAVENOUS at 02:09

## 2023-09-11 RX ADMIN — ROCURONIUM BROMIDE 50 MG: 10 INJECTION, SOLUTION INTRAVENOUS at 12:09

## 2023-09-11 NOTE — ANESTHESIA PROCEDURE NOTES
Intubation    Date/Time: 9/11/2023 12:49 PM    Performed by: Karey Disla CRNA  Authorized by: Manoj Barillas MD    Intubation:     Induction:  Intravenous    Intubated:  Postinduction    Mask Ventilation:  Easy mask    Attempts:  1    Attempted By:  CRNA    Method of Intubation:  Direct    Blade:  Silviano 4    Laryngeal View Grade: Grade III - only epiglottis visible      Difficult Airway Encountered?: No      Complications:  None    Airway Device:  Oral endotracheal tube    Airway Device Size:  9.0    Style/Cuff Inflation:  Cuffed (inflated to minimal occlusive pressure)    Inflation Amount (mL):  7    Tube secured:  22    Placement Verified By:  Capnometry    Complicating Factors:  None    Findings Post-Intubation:  BS equal bilateral and atraumatic/condition of teeth unchanged

## 2023-09-11 NOTE — TRANSFER OF CARE
"Anesthesia Transfer of Care Note    Patient: Viviane Seaman    Procedure(s) Performed: * No procedures listed *    Patient location: PACU    Anesthesia Type: general    Transport from OR: Transported from OR on 2-3 L/min O2 by NC with adequate spontaneous ventilation    Post pain: adequate analgesia    Post assessment: no apparent anesthetic complications and tolerated procedure well    Post vital signs: stable    Level of consciousness: awake and oriented    Nausea/Vomiting: no nausea/vomiting    Complications: none    Transfer of care protocol was followed      Last vitals:   Visit Vitals  BP (!) 154/96 (BP Location: Left arm, Patient Position: Lying)   Pulse 99   Temp 37.3 °C (99.1 °F) (Oral)   Resp (!) 27   Ht 5' 8" (1.727 m)   Wt 86.6 kg (191 lb)   LMP  (LMP Unknown)   SpO2 98%   Breastfeeding No   BMI 29.04 kg/m²     "

## 2023-09-11 NOTE — OR NURSING
1615-Pt rec'd to RR sedated, stable condition, responds to stimulation, on 2L O2 via simple fm, will titrate O2 PRN,  NADN, SaO2-98%, resp even et unlabored, IV fluids infusing, no bleeding/drainage from throat, no s/s pain, will con't to monitor, safety measures in effect.    1630-CXR done at bedside.    1635-Pt placed on room air, SaO2-95%, NADN, no c/o pain.    1650-Pt resting, NADN, taking pt back to ASC room 7 to be discharged.    1700-Pt care released to Mary(RN), pt awake, family at bedside, vs hr-93, resp-17, SaO2-95% on room air, b/p 132/81.

## 2023-09-11 NOTE — DISCHARGE INSTRUCTIONS
No Carbinated beverages for one week.  If you begin to have any blood with coughing dont hesitate to call Dr. Fragoso

## 2023-09-11 NOTE — ANESTHESIA PREPROCEDURE EVALUATION
09/11/2023  Viviane Seaman is a 48 y.o., female.      Pre-op Assessment    I have reviewed the Patient Summary Reports.    I have reviewed the NPO Status.   I have reviewed the Medications.     Review of Systems         Anesthesia Plan  Type of Anesthesia, risks & benefits discussed:    Anesthesia Type: Gen ETT  Intra-op Monitoring Plan: Standard ASA Monitors  Post Op Pain Control Plan: IV/PO Opioids PRN  Induction:  IV  Informed Consent: Informed consent signed with the Patient and all parties understand the risks and agree with anesthesia plan.  All questions answered.   ASA Score: 3    Ready For Surgery From Anesthesia Perspective.     .  NPO greater than 8 hours  NAC  Allergic to cephalexin    Hct 41  5/29/23 EKG: Normal sinus rhythm; 83 bpm; Cannot rule out Anterior infarct ,age undetermined    HTN  GERD  COPD.  Left lung mass  Anxiety  H/o panic attack  Depression  H/o amphetamine use disorder  H/o back pain  Chronic pain syndrome    Adequate ROM at neck

## 2023-09-11 NOTE — INTERVAL H&P NOTE
The patient has been examined and the H&P has been reviewed:    I concur with the findings and changes have been noted since the H&P was written: Patient with CT Chest05/29 with L lingula (inferior) and LLL connecting fisural mass (anteriorbasal) with large 4R and 7. Previous CT Chest 11/2021 with LLL consolidation 2.7 cm. Presenting for Bronchoscopy with EBUS to include transbronchial needle aspiration and transbronchial biopsy.           There are no hospital problems to display for this patient.

## 2023-09-11 NOTE — PATIENT INSTRUCTIONS
POST BRONCHOSCOPY DISCHARGE INSTRUCTIONS:  Today you have undergone a procedure called a bronchoscopy with biopsy. You underwent general anesthesia and it will be in your body for the following hours up to 24 hours. It is essential that you are accompanied home by a responsible adult. I recommend that they stay with you during this period. You should NOT drive a vehicle, operate any form of machinery, consume alcohol or sign legal documentation during this time. After 24 hours, the effect of sedation should have worn off and you will be able to start normal activities.      DIET: You may begin a normal diet and fluids 2 HOURS following leaving the hospital. This will make sure your swallowing muscles have recovered properly.      MEDICATIONS: You may resume your usual prescriptions tomorrow.      BIOPSIES AND SPECIMENS: The biopsies that were taken following your procedure have been taken for processing and testing. It may take up to 1 week, and sometime longer, to get the final result. You will receive a call from my office to schedule a follow up to discuss the results. If you prefer, I can also call your with the results once I have received and reviewed them.      SYMPTOMS:  Because of the biopsies taken, you may experience coughing with some blood come up and a low grade fever for <24 hrs.     Awareness of the following unusual symptoms should prompt you to call our office at [phone] to discuss a plan for management. These unusual symptoms include:  Sudden breathing distress, such as being more breathless than you normally are  Chest pain not responding to medication  Persistently high temperature or fever  Coughing up copious amount of blood or blood clots  Sudden swelling of your previous IV sites      It was my honor and pleasure to be your doctor to perform this diagnostic procedure.    NEW MEDICATION:  Gabapentin 100 mg by mouth three times a day. Start taking this 09/12/2023 in the morning. Do not  operate heavy machinery after taking this medication. This medication can make you groggy, please limit your activity after your first dose to make sure you are not over-sedated.     Anita Fragoso MD  Pulmonary and Critical Care  [facility]

## 2023-09-12 LAB
DHEA SERPL-MCNC: NORMAL
ESTROGEN SERPL-MCNC: NORMAL PG/ML
INSULIN SERPL-ACNC: NORMAL U[IU]/ML
LAB AP COMMENTS: NORMAL
LAB AP COMMENTS: NORMAL
LAB AP GROSS DESCRIPTION: NORMAL
LAB AP LABORATORY NOTES: NORMAL
RUSH AP QUICK STAIN DIAGNOSIS: NORMAL
RUSH AP QUICK STAIN DIAGNOSIS: NORMAL
T3RU NFR SERPL: NORMAL %

## 2023-09-12 NOTE — ANESTHESIA POSTPROCEDURE EVALUATION
Anesthesia Post Evaluation    Patient: Viviane Seaman    Procedure(s) Performed: * No procedures listed *    Final Anesthesia Type: general      Patient location during evaluation: PACU  Post-procedure vital signs: reviewed and stable  Pain management: adequate  Airway patency: patent    PONV status at discharge: No PONV  Anesthetic complications: no      Cardiovascular status: hemodynamically stable  Respiratory status: unassisted  Hydration status: euvolemic  Follow-up not needed.          Vitals Value Taken Time   /78 09/11/23 1816   Temp 37.3 °C (99.1 °F) 09/11/23 1622   Pulse 97 09/11/23 1820   Resp 17 09/11/23 1700   SpO2 99 % 09/11/23 1820   Vitals shown include unvalidated device data.      Event Time   Out of Recovery 16:50:00         Pain/Tuyet Score: Tuyet Score: 9 (9/11/2023  5:05 PM)

## 2023-09-13 ENCOUNTER — TELEPHONE (OUTPATIENT)
Dept: PULMONOLOGY | Facility: CLINIC | Age: 48
End: 2023-09-13
Payer: MEDICAID

## 2023-09-13 ENCOUNTER — PATIENT MESSAGE (OUTPATIENT)
Dept: PULMONOLOGY | Facility: HOSPITAL | Age: 48
End: 2023-09-13
Payer: MEDICAID

## 2023-09-13 DIAGNOSIS — C34.92 ADENOCARCINOMA OF LEFT LUNG: Primary | ICD-10-CM

## 2023-09-13 DIAGNOSIS — C34.92 SQUAMOUS CELL LUNG CANCER, LEFT: ICD-10-CM

## 2023-09-13 DIAGNOSIS — C34.90 MALIGNANT NEOPLASM OF UNSPECIFIED PART OF UNSPECIFIED BRONCHUS OR LUNG: ICD-10-CM

## 2023-09-13 RX ORDER — NITROFURANTOIN 25; 75 MG/1; MG/1
100 CAPSULE ORAL 2 TIMES DAILY
Qty: 10 CAPSULE | Refills: 0 | Status: SHIPPED | OUTPATIENT
Start: 2023-09-13 | End: 2023-09-18

## 2023-09-13 NOTE — TELEPHONE ENCOUNTER
Called patient to discuss results of EBUS with biopsy per their preference.  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  She is tearful and expresses interest in treatment.  I a.m. concerned based on the bronchoscopic procedure that there is a progression in her disease and to this end we will plan on getting a PET-CT along with referral to Oncology.    She is agreeable to this plan.    With regards to her symptoms, she has a cough that is now productive (previously with a nonproductive cough) with bloody streaks in sputum that is a dull collar.  It has decreased in quantity and she is not coughing constantly.  She denies any blood clots.  She has some rib discomfort which he attributes to her coughing episodes.  Discussed that this is the expected course and reiterated alarm signs to look out for.  She has our clinic number to reach out in the event of any alarming symptoms.      Anita Fragoso MD  Pulmonary and Critical Care  [facility]

## 2023-09-14 LAB — BACTERIA TISS AEROBE CULT: NORMAL

## 2023-09-21 ENCOUNTER — TELEPHONE (OUTPATIENT)
Dept: PULMONOLOGY | Facility: CLINIC | Age: 48
End: 2023-09-21
Payer: MEDICAID

## 2023-09-21 NOTE — TELEPHONE ENCOUNTER
"----- Message from Radha Lerma sent at 9/21/2023 11:08 AM CDT -----  Regarding: Medication  "Type:  Patient Call Back    Who Called:PT    What is the reqeust in detail:Pt would like to know if doctor can call something in for cough, naseau, and chest pains. Pt had Bisopy on 9/11. Please advise    Can the clinic reply by [facility]?no    Best Call Back Number:[phone]      Additional Information: [facility] - GABRIELA RODRIGUEZ - [facility] [place]            "

## 2023-09-25 DIAGNOSIS — C34.92 ADENOCARCINOMA OF LEFT LUNG: Primary | ICD-10-CM

## 2023-09-26 NOTE — DISCHARGE SUMMARY
[facility] - Endoscopy  Discharge Note  Short Stay    EBUS    PROCEDURES: Bronchoscopy with Endobronchial ultrasound, Transbronchial needle aspiration of lymph nodes, Transbronchial biopsy of lung, and Transbronchial needle aspiration of lung  See procedure note for further details.    OUTCOME: Patient tolerated the procedure well without complication and is now ready for discharge    DISPOSITION: Home or self care    FINAL DIAGNOSIS: Left upper lobe mass. Left lower lobe mass. Mediastinal and hilar lymphadenopathy.    FOLLOW UP: Patient requests notification of results over the phone. Will coordinate follow up vs referral thereafter.    DISCHARGE INSTRUCTIONS: Post-bronchoscopy instructions discussed with patient. Entered into AVS.       Discussed with patient and accompanying family/friend management plans, all questions were answered and they verbalized understanding.     Anita Fragoso MD  Pulmonary and Critical Care  [facility]

## 2023-09-26 NOTE — TELEPHONE ENCOUNTER
Called patient today. She has stable but persistent symptoms of cough and neck discomfort. Unfortunately, prescribed Gabapentin from 09/12 was not transmitted to pharmacy. I expressed my sincere apology for this error. It appear a written script was required.   Pharmacy was called and order was placed. She was advised that the prescription would be available in 10 minutes.   Jurgen will have a family member to  the prescription.     Anita Fragoso MD  Pulmonary and Critical Care  [facility]

## 2023-09-29 RX ORDER — GABAPENTIN 100 MG/1
300 CAPSULE ORAL 3 TIMES DAILY
Qty: 90 CAPSULE | Refills: 5 | OUTPATIENT
Start: 2023-09-29

## 2023-11-20 ENCOUNTER — HOSPITAL ENCOUNTER (EMERGENCY)
Facility: HOSPITAL | Age: 48
Discharge: HOME | End: 2023-11-20
Attending: EMERGENCY MEDICINE
Payer: MEDICAID

## 2023-11-20 VITALS
BODY MASS INDEX: 23.81 KG/M2 | DIASTOLIC BLOOD PRESSURE: 74 MMHG | SYSTOLIC BLOOD PRESSURE: 130 MMHG | RESPIRATION RATE: 20 BRPM | HEIGHT: 68 IN | TEMPERATURE: 98 F | OXYGEN SATURATION: 96 % | HEART RATE: 80 BPM | WEIGHT: 157.13 LBS

## 2023-11-20 DIAGNOSIS — R13.19 ESOPHAGEAL DYSPHAGIA: ICD-10-CM

## 2023-11-20 DIAGNOSIS — R11.2 NAUSEA AND VOMITING, UNSPECIFIED VOMITING TYPE: Primary | ICD-10-CM

## 2023-11-20 LAB
ALBUMIN SERPL BCP-MCNC: 2.8 G/DL (ref 3.5–5)
ALBUMIN/GLOB SERPL: 0.5 {RATIO}
ALP SERPL-CCNC: 115 U/L (ref 41–108)
ALT SERPL W P-5'-P-CCNC: 11 U/L (ref 13–56)
AMYLASE SERPL-CCNC: 20 U/L (ref 25–115)
ANION GAP SERPL CALCULATED.3IONS-SCNC: 12 MMOL/L (ref 7–16)
AST SERPL W P-5'-P-CCNC: 15 U/L (ref 15–37)
BACTERIA #/AREA URNS HPF: ABNORMAL /HPF
BASOPHILS # BLD AUTO: 0.05 K/UL (ref 0–0.2)
BASOPHILS NFR BLD AUTO: 0.4 % (ref 0–1)
BILIRUB SERPL-MCNC: 0.5 MG/DL (ref ?–1.2)
BILIRUB UR QL STRIP: NEGATIVE
BUN SERPL-MCNC: 18 MG/DL (ref 7–18)
BUN/CREAT SERPL: 32 (ref 6–20)
CALCIUM SERPL-MCNC: 9.6 MG/DL (ref 8.5–10.1)
CAOX CRY #/AREA URNS LPF: ABNORMAL /LPF
CHLORIDE SERPL-SCNC: 89 MMOL/L (ref 98–107)
CLARITY UR: ABNORMAL
CO2 SERPL-SCNC: 31 MMOL/L (ref 21–32)
COLOR UR: ABNORMAL
CREAT SERPL-MCNC: 0.57 MG/DL (ref 0.55–1.02)
DIFFERENTIAL METHOD BLD: ABNORMAL
EGFR (NO RACE VARIABLE) (RUSH/TITUS): 111 ML/MIN/1.73M2
EOSINOPHIL # BLD AUTO: 0.13 K/UL (ref 0–0.5)
EOSINOPHIL NFR BLD AUTO: 1.1 % (ref 1–4)
ERYTHROCYTE [DISTWIDTH] IN BLOOD BY AUTOMATED COUNT: 14.6 % (ref 11.5–14.5)
GLOBULIN SER-MCNC: 5.4 G/DL (ref 2–4)
GLUCOSE SERPL-MCNC: 116 MG/DL (ref 74–106)
GLUCOSE UR STRIP-MCNC: NEGATIVE MG/DL
HCT VFR BLD AUTO: 37.3 % (ref 38–47)
HGB BLD-MCNC: 12.1 G/DL (ref 12–16)
IMM GRANULOCYTES # BLD AUTO: 0.04 K/UL (ref 0–0.04)
IMM GRANULOCYTES NFR BLD: 0.3 % (ref 0–0.4)
KETONES UR STRIP-SCNC: 15 MG/DL
LEUKOCYTE ESTERASE UR QL STRIP: ABNORMAL
LIPASE SERPL-CCNC: <10 U/L (ref 73–393)
LYMPHOCYTES # BLD AUTO: 1.03 K/UL (ref 1–4.8)
LYMPHOCYTES NFR BLD AUTO: 8.6 % (ref 27–41)
MAGNESIUM SERPL-MCNC: 1.8 MG/DL (ref 1.7–2.3)
MCH RBC QN AUTO: 26.8 PG (ref 27–31)
MCHC RBC AUTO-ENTMCNC: 32.4 G/DL (ref 32–36)
MCV RBC AUTO: 82.7 FL (ref 80–96)
MONOCYTES # BLD AUTO: 0.97 K/UL (ref 0–0.8)
MONOCYTES NFR BLD AUTO: 8.1 % (ref 2–6)
MPC BLD CALC-MCNC: 9.5 FL (ref 9.4–12.4)
MUCOUS THREADS #/AREA URNS HPF: ABNORMAL /HPF
NEUTROPHILS # BLD AUTO: 9.75 K/UL (ref 1.8–7.7)
NEUTROPHILS NFR BLD AUTO: 81.5 % (ref 53–65)
NITRITE UR QL STRIP: NEGATIVE
NRBC # BLD AUTO: 0 X10E3/UL
NRBC, AUTO (.00): 0 %
PH UR STRIP: 6 PH UNITS
PLATELET # BLD AUTO: 334 K/UL (ref 150–400)
POTASSIUM SERPL-SCNC: 4 MMOL/L (ref 3.5–5.1)
PROT SERPL-MCNC: 8.2 G/DL (ref 6.4–8.2)
PROT UR QL STRIP: 100
RBC # BLD AUTO: 4.51 M/UL (ref 4.2–5.4)
RBC # UR STRIP: ABNORMAL /UL
RBC #/AREA URNS HPF: ABNORMAL /HPF
SODIUM SERPL-SCNC: 128 MMOL/L (ref 136–145)
SP GR UR STRIP: 1.02
SQUAMOUS #/AREA URNS LPF: ABNORMAL /LPF
TRANS CELLS #/AREA URNS LPF: ABNORMAL /LPF
UROBILINOGEN UR STRIP-ACNC: 1 MG/DL
WBC # BLD AUTO: 11.97 K/UL (ref 4.5–11)
WBC #/AREA URNS HPF: ABNORMAL /HPF

## 2023-11-20 PROCEDURE — 99284 PR EMERGENCY DEPT VISIT,LEVEL IV: ICD-10-PCS | Mod: ,,, | Performed by: EMERGENCY MEDICINE

## 2023-11-20 PROCEDURE — 96374 THER/PROPH/DIAG INJ IV PUSH: CPT

## 2023-11-20 PROCEDURE — 87186 SC STD MICRODIL/AGAR DIL: CPT | Performed by: EMERGENCY MEDICINE

## 2023-11-20 PROCEDURE — 83735 ASSAY OF MAGNESIUM: CPT | Performed by: EMERGENCY MEDICINE

## 2023-11-20 PROCEDURE — 82150 ASSAY OF AMYLASE: CPT | Performed by: EMERGENCY MEDICINE

## 2023-11-20 PROCEDURE — 63600175 PHARM REV CODE 636 W HCPCS: Performed by: EMERGENCY MEDICINE

## 2023-11-20 PROCEDURE — 87077 CULTURE AEROBIC IDENTIFY: CPT | Performed by: EMERGENCY MEDICINE

## 2023-11-20 PROCEDURE — 83690 ASSAY OF LIPASE: CPT | Performed by: EMERGENCY MEDICINE

## 2023-11-20 PROCEDURE — 80053 COMPREHEN METABOLIC PANEL: CPT | Performed by: EMERGENCY MEDICINE

## 2023-11-20 PROCEDURE — 99284 EMERGENCY DEPT VISIT MOD MDM: CPT | Mod: ,,, | Performed by: EMERGENCY MEDICINE

## 2023-11-20 PROCEDURE — 25000003 PHARM REV CODE 250: Performed by: EMERGENCY MEDICINE

## 2023-11-20 PROCEDURE — 99284 EMERGENCY DEPT VISIT MOD MDM: CPT | Mod: 25

## 2023-11-20 PROCEDURE — 96361 HYDRATE IV INFUSION ADD-ON: CPT

## 2023-11-20 PROCEDURE — 85025 COMPLETE CBC W/AUTO DIFF WBC: CPT | Performed by: EMERGENCY MEDICINE

## 2023-11-20 PROCEDURE — 81001 URINALYSIS AUTO W/SCOPE: CPT | Performed by: EMERGENCY MEDICINE

## 2023-11-20 RX ORDER — IBUPROFEN 800 MG/1
800 TABLET ORAL 2 TIMES DAILY PRN
COMMUNITY
Start: 2023-10-12

## 2023-11-20 RX ORDER — OMEPRAZOLE 20 MG/1
20 CAPSULE, DELAYED RELEASE ORAL DAILY
COMMUNITY
Start: 2023-10-12

## 2023-11-20 RX ORDER — ONDANSETRON 4 MG/1
4 TABLET, ORALLY DISINTEGRATING ORAL EVERY 6 HOURS PRN
COMMUNITY
Start: 2023-10-05 | End: 2023-11-20

## 2023-11-20 RX ORDER — ONDANSETRON 4 MG/1
4 TABLET, ORALLY DISINTEGRATING ORAL EVERY 6 HOURS PRN
Qty: 12 TABLET | Refills: 0 | Status: ON HOLD | OUTPATIENT
Start: 2023-11-20 | End: 2023-11-26 | Stop reason: HOSPADM

## 2023-11-20 RX ORDER — PROCHLORPERAZINE EDISYLATE 5 MG/ML
10 INJECTION INTRAMUSCULAR; INTRAVENOUS
Status: COMPLETED | OUTPATIENT
Start: 2023-11-20 | End: 2023-11-20

## 2023-11-20 RX ADMIN — SODIUM CHLORIDE 1000 ML: 9 INJECTION, SOLUTION INTRAVENOUS at 07:11

## 2023-11-20 RX ADMIN — PROCHLORPERAZINE EDISYLATE 10 MG: 5 INJECTION INTRAMUSCULAR; INTRAVENOUS at 07:11

## 2023-11-21 NOTE — ED PROVIDER NOTES
Encounter Date: 2023       History     Chief Complaint   Patient presents with    Vomiting     Patient presents with nausea and vomiting, states she has this every day for a while now, reports she has a history of lung cancer and is awaiting treatment.  No abdominal pain.  States the nausea and vomiting was worse today so she came to the emergency department because she was feeling weak.  She sees Dr. Bolton for primary care.  She is awaiting referral to a gastroenterologist from Dr. Bolton.      Review of patient's allergies indicates:   Allergen Reactions    Cephalexin Swelling     Past Medical History:   Diagnosis Date    COPD (chronic obstructive pulmonary disease)     Esophageal stricture     Hiatal hernia     Hypertension     Lung cancer      Past Surgical History:   Procedure Laterality Date     SECTION      CHOLECYSTECTOMY      DILATION AND CURETTAGE OF UTERUS      EYE SURGERY Left     HIP SURGERY Left     PLATE    JOINT REPLACEMENT Left     HIP    LEG SURGERY Left     TONSILLECTOMY       History reviewed. No pertinent family history.  Social History     Tobacco Use    Smoking status: Former     Current packs/day: 0.50     Types: Cigarettes    Smokeless tobacco: Never   Substance Use Topics    Alcohol use: Never    Drug use: Never     Review of Systems   Constitutional:  Negative for activity change, appetite change and fever.   HENT: Negative.     Eyes: Negative.    Respiratory: Negative.  Negative for apnea, cough, choking, chest tightness, shortness of breath, wheezing and stridor.    Cardiovascular: Negative.  Negative for chest pain, palpitations and leg swelling.   Gastrointestinal:  Positive for nausea and vomiting. Negative for abdominal distention, abdominal pain, blood in stool, constipation and diarrhea.   Genitourinary: Negative.    Musculoskeletal: Negative.    Skin: Negative.    Neurological: Negative.    Psychiatric/Behavioral: Negative.     All other systems reviewed and are  negative.      Physical Exam     Initial Vitals [11/20/23 1838]   BP Pulse Resp Temp SpO2   (!) 161/99 100 (!) 24 98.4 °F (36.9 °C) 96 %      MAP       --         Physical Exam    Nursing note and vitals reviewed.  Constitutional: She appears well-developed and well-nourished.   HENT:   Right Ear: External ear normal.   Left Ear: External ear normal.   Nose: Nose normal.   Mouth/Throat: Mucous membranes are dry. No oropharyngeal exudate.   Eyes: Conjunctivae and EOM are normal. Pupils are equal, round, and reactive to light.   Neck: Neck supple. No JVD present.   Normal range of motion.  Cardiovascular:  Regular rhythm, normal heart sounds and intact distal pulses.   Tachycardia present.         No murmur heard.  Pulmonary/Chest: Breath sounds normal. No stridor. No respiratory distress. She has no wheezes. She has no rhonchi. She has no rales.   Abdominal: Abdomen is soft. Bowel sounds are normal. She exhibits no distension and no mass. There is no abdominal tenderness. There is no rebound and no guarding.   Musculoskeletal:         General: No edema. Normal range of motion.      Cervical back: Normal range of motion and neck supple.     Lymphadenopathy:     She has no cervical adenopathy.   Neurological: She is alert and oriented to person, place, and time. She has normal strength. No cranial nerve deficit. GCS score is 15. GCS eye subscore is 4. GCS verbal subscore is 5. GCS motor subscore is 6.   Skin: Skin is warm and dry. Capillary refill takes 2 to 3 seconds. No rash noted. No erythema. No pallor.   Patient has a dark scaly discoloration of the skin of both lower extremities.   Psychiatric: She has a normal mood and affect. Her behavior is normal.         Medical Screening Exam   See Full Note    ED Course   Procedures  Labs Reviewed   AMYLASE - Abnormal; Notable for the following components:       Result Value    Amylase 20 (*)     All other components within normal limits   COMPREHENSIVE METABOLIC PANEL -  Abnormal; Notable for the following components:    Sodium 128 (*)     Chloride 89 (*)     Glucose 116 (*)     BUN/Creatinine Ratio 32 (*)     Albumin 2.8 (*)     Globulin 5.4 (*)     Alk Phos 115 (*)     ALT 11 (*)     All other components within normal limits   LIPASE - Abnormal; Notable for the following components:    Lipase <10 (*)     All other components within normal limits   URINALYSIS, REFLEX TO URINE CULTURE - Abnormal; Notable for the following components:    Leukocytes, UA Trace (*)     Protein,  (*)     Ketones, UA 15 (*)     Blood, UA Trace-Intact (*)     All other components within normal limits   CBC WITH DIFFERENTIAL - Abnormal; Notable for the following components:    WBC 11.97 (*)     Hematocrit 37.3 (*)     MCH 26.8 (*)     RDW 14.6 (*)     Neutrophils % 81.5 (*)     Lymphocytes % 8.6 (*)     Monocytes % 8.1 (*)     Neutrophils, Abs 9.75 (*)     Monocytes, Absolute 0.97 (*)     All other components within normal limits   MAGNESIUM - Normal   CBC W/ AUTO DIFFERENTIAL    Narrative:     The following orders were created for panel order CBC auto differential.  Procedure                               Abnormality         Status                     ---------                               -----------         ------                     CBC with Differential[[id number]]       Abnormal            Final result                 Please view results for these tests on the individual orders.   URINALYSIS, MICROSCOPIC          Imaging Results              XR ABDOMEN  ACUTE 2 OR MORE VIEWS WITH CHEST (Final result)  Result time 11/20/23 19:44:41      Final result by Jayden Santiago DO (11/20/23 19:44:41)                   Impression:      As above.    Point of Service: [facility]      Electronically signed by: Jayden Santiago  Date:    11/20/2023  Time:    19:44               Narrative:    EXAMINATION:  XR ABDOMEN ACUTE 2 OR MORE VIEWS WITH CHEST    CLINICAL HISTORY:  Vomiting, lung  cancer;    COMPARISON:  Acute abdominal series September 6, 2023    TECHNIQUE:  Single frontal view of the chest as well as frontal views of the abdomen in the supine and upright  position.    FINDINGS:  Persistent left lower lung atelectasis/infiltrate/scarring.  Prominence of the left hilum and malignancy is not excluded.  Heart partially obscured.  Nonspecific nonobstructive bowel gas pattern.  Moderate fecal material throughout the colon may reflect an element of constipation.  Surgical clips project over the right upper quadrant of the abdomen.  Total left hip arthroplasty.                                       Medications   sodium chloride 0.9% bolus 1,000 mL 1,000 mL (0 mLs Intravenous Stopped 11/20/23 2027)   prochlorperazine injection Soln 10 mg (10 mg Intravenous Given 11/20/23 1924)     Medical Decision Making  Differential diagnosis includes bowel obstruction, viral gastroenteritis, chronic nausea and vomiting of uncertain etiology.  Patient reports that she does have trouble swallowing and was told she needs to have her esophagus stretched.  She could be also having dysphagia with food getting stuck causing nausea and vomiting.    Patient was given IV normal saline and IV prochlorperazine.  Patient feels better.  Discharge and follow up instructions given.  Appointment with Gastroenterology requested via computer request.    Amount and/or Complexity of Data Reviewed  Labs: ordered. Decision-making details documented in ED Course.  Radiology: ordered. Decision-making details documented in ED Course.    Risk  Prescription drug management.               ED Course as of 11/20/23 2101 Mon Nov 20, 2023 2024 Lipase(!)  Lipase is not elevated [LM]   2024 Amylase(!)  Amylase is not elevated [LM]   2024 Magnesium  Magnesium is normal [LM]   2025 CBC auto differential(!)  CBC shows white count borderline elevated at 11.97 with hemoglobin and hematocrit essentially normal at 12.1 and 37.3, platelet count 334,  81.5% neutrophils. [LM]   2025 Comprehensive metabolic panel(!)  Comprehensive metabolic profile shows sodium low at 128, potassium 4.0, chloride 89 normal serum CO2, glucose borderline elevated at 116.  BUN is 18 and creatinine 0.57 with a BUN creatinine ratio of 32 indicating mild prerenal azotemia and probable dehydration.  Albumin is low at 2.8.  Bilirubin is normal.  Alkaline phosphatase is increased at 115, ALT and AST are normal.  Estimated GFR is 111 [LM]   2027 XR ABDOMEN  ACUTE 2 OR MORE VIEWS WITH CHEST  Review of radiologist's report for x-ray of the abdomen acute with chest view as well indicates changes left lower lung (chronic, with known lung malignancy, awaiting treatment).  Increased stool in the colon may represent constipation, no evidence of bowel obstruction reported. [LM]      ED Course User Index  [LM] Malcom Arana DO                        Clinical Impression:   Final diagnoses:  [R11.2] Nausea and vomiting, unspecified vomiting type (Primary)  [R13.19] Esophageal dysphagia        ED Disposition Condition    Discharge Stable          ED Prescriptions       Medication Sig Dispense Start Date End Date Auth. Provider    ondansetron (ZOFRAN-ODT) 4 MG TbDL Take 1 tablet (4 mg total) by mouth every 6 (six) hours as needed (Nausea or vomiting). 12 tablet 11/20/2023 11/23/2023 Malcom Arana DO          Follow-up Information       Follow up With Specialties Details Why Contact Info    Justin Bolton MD Family Medicine Schedule an appointment as soon as possible for a visit in 2 days To recheck; sooner if worse, not improving, or if any new symptoms. [address]  [address]  [facility] [address]  [phone]               Malcom Arana DO  11/20/23 2105

## 2023-11-21 NOTE — ED TRIAGE NOTES
Pt with vomiting today. Pt states that she needs her esophagus stretched so her always has trouble swallowing and cough a lot after drinking anything and that makes her spit up 4-5x daily but she has actually been vomiting that started today. Pt states that she has lung cancer dx in September and is waiting for chemo treatment to start. Sees dr olvera at [facility].

## 2023-11-21 NOTE — DISCHARGE INSTRUCTIONS
We have requested a follow-up appointment for you with Gastroenterology for consultation, if you do not hear from somebody in the next 3 days, please contact your primary care physician for a referral.

## 2023-11-23 ENCOUNTER — HOSPITAL ENCOUNTER (INPATIENT)
Facility: HOSPITAL | Age: 48
LOS: 2 days | Discharge: HOME | DRG: 375 | End: 2023-11-26
Attending: FAMILY MEDICINE | Admitting: INTERNAL MEDICINE
Payer: MEDICAID

## 2023-11-23 DIAGNOSIS — R13.10 DYSPHAGIA, UNSPECIFIED TYPE: Primary | ICD-10-CM

## 2023-11-23 DIAGNOSIS — E86.0 DEHYDRATION: ICD-10-CM

## 2023-11-23 DIAGNOSIS — R13.19 ESOPHAGEAL DYSPHAGIA: ICD-10-CM

## 2023-11-23 DIAGNOSIS — R07.9 CHEST PAIN: ICD-10-CM

## 2023-11-23 DIAGNOSIS — C34.02 MALIGNANT NEOPLASM OF HILUS OF LEFT LUNG: ICD-10-CM

## 2023-11-23 LAB
ALBUMIN SERPL BCP-MCNC: 2.5 G/DL (ref 3.5–5)
ALBUMIN/GLOB SERPL: 0.5 {RATIO}
ALP SERPL-CCNC: 117 U/L (ref 41–108)
ALT SERPL W P-5'-P-CCNC: 13 U/L (ref 13–56)
ANION GAP SERPL CALCULATED.3IONS-SCNC: 10 MMOL/L (ref 7–16)
AST SERPL W P-5'-P-CCNC: 7 U/L (ref 15–37)
BASOPHILS # BLD AUTO: 0.05 K/UL (ref 0–0.2)
BASOPHILS NFR BLD AUTO: 0.4 % (ref 0–1)
BILIRUB SERPL-MCNC: 0.4 MG/DL (ref ?–1.2)
BUN SERPL-MCNC: 10 MG/DL (ref 7–18)
BUN/CREAT SERPL: 21 (ref 6–20)
CALCIUM SERPL-MCNC: 9.6 MG/DL (ref 8.5–10.1)
CHLORIDE SERPL-SCNC: 96 MMOL/L (ref 98–107)
CO2 SERPL-SCNC: 31 MMOL/L (ref 21–32)
CREAT SERPL-MCNC: 0.47 MG/DL (ref 0.55–1.02)
D DIMER PPP FEU-MCNC: 2.58 ΜG/ML (ref 0–0.47)
DIFFERENTIAL METHOD BLD: ABNORMAL
EGFR (NO RACE VARIABLE) (RUSH/TITUS): 116 ML/MIN/1.73M2
EOSINOPHIL # BLD AUTO: 0.13 K/UL (ref 0–0.5)
EOSINOPHIL NFR BLD AUTO: 1.1 % (ref 1–4)
ERYTHROCYTE [DISTWIDTH] IN BLOOD BY AUTOMATED COUNT: 14.6 % (ref 11.5–14.5)
GLOBULIN SER-MCNC: 5.2 G/DL (ref 2–4)
GLUCOSE SERPL-MCNC: 104 MG/DL (ref 74–106)
HCT VFR BLD AUTO: 36.3 % (ref 38–47)
HGB BLD-MCNC: 11.7 G/DL (ref 12–16)
IMM GRANULOCYTES # BLD AUTO: 0.05 K/UL (ref 0–0.04)
IMM GRANULOCYTES NFR BLD: 0.4 % (ref 0–0.4)
LYMPHOCYTES # BLD AUTO: 0.99 K/UL (ref 1–4.8)
LYMPHOCYTES NFR BLD AUTO: 8.7 % (ref 27–41)
MCH RBC QN AUTO: 26.7 PG (ref 27–31)
MCHC RBC AUTO-ENTMCNC: 32.2 G/DL (ref 32–36)
MCV RBC AUTO: 82.9 FL (ref 80–96)
MONOCYTES # BLD AUTO: 1.07 K/UL (ref 0–0.8)
MONOCYTES NFR BLD AUTO: 9.4 % (ref 2–6)
MPC BLD CALC-MCNC: 10 FL (ref 9.4–12.4)
NEUTROPHILS # BLD AUTO: 9.12 K/UL (ref 1.8–7.7)
NEUTROPHILS NFR BLD AUTO: 80 % (ref 53–65)
NRBC # BLD AUTO: 0 X10E3/UL
NRBC, AUTO (.00): 0 %
NT-PROBNP SERPL-MCNC: 189 PG/ML (ref 1–125)
PLATELET # BLD AUTO: 330 K/UL (ref 150–400)
POTASSIUM SERPL-SCNC: 3.8 MMOL/L (ref 3.5–5.1)
PROT SERPL-MCNC: 7.7 G/DL (ref 6.4–8.2)
RBC # BLD AUTO: 4.38 M/UL (ref 4.2–5.4)
SODIUM SERPL-SCNC: 133 MMOL/L (ref 136–145)
TROPONIN I SERPL DL<=0.01 NG/ML-MCNC: <4 PG/ML
UA COMPLETE W REFLEX CULTURE PNL UR: ABNORMAL
WBC # BLD AUTO: 11.41 K/UL (ref 4.5–11)

## 2023-11-23 PROCEDURE — 85379 FIBRIN DEGRADATION QUANT: CPT | Performed by: NURSE PRACTITIONER

## 2023-11-23 PROCEDURE — 25000003 PHARM REV CODE 250: Performed by: NURSE PRACTITIONER

## 2023-11-23 PROCEDURE — 96361 HYDRATE IV INFUSION ADD-ON: CPT

## 2023-11-23 PROCEDURE — 83880 ASSAY OF NATRIURETIC PEPTIDE: CPT | Performed by: NURSE PRACTITIONER

## 2023-11-23 PROCEDURE — 99285 EMERGENCY DEPT VISIT HI MDM: CPT | Mod: ,,, | Performed by: FAMILY MEDICINE

## 2023-11-23 PROCEDURE — 93010 EKG 12-LEAD: ICD-10-PCS | Mod: ,,, | Performed by: INTERNAL MEDICINE

## 2023-11-23 PROCEDURE — 93005 ELECTROCARDIOGRAM TRACING: CPT

## 2023-11-23 PROCEDURE — 85025 COMPLETE CBC W/AUTO DIFF WBC: CPT | Performed by: NURSE PRACTITIONER

## 2023-11-23 PROCEDURE — 93010 ELECTROCARDIOGRAM REPORT: CPT | Mod: ,,, | Performed by: INTERNAL MEDICINE

## 2023-11-23 PROCEDURE — 84484 ASSAY OF TROPONIN QUANT: CPT | Performed by: NURSE PRACTITIONER

## 2023-11-23 PROCEDURE — 99285 PR EMERGENCY DEPT VISIT,LEVEL V: ICD-10-PCS | Mod: ,,, | Performed by: FAMILY MEDICINE

## 2023-11-23 PROCEDURE — 80053 COMPREHEN METABOLIC PANEL: CPT | Performed by: NURSE PRACTITIONER

## 2023-11-23 RX ORDER — NITROFURANTOIN 25; 75 MG/1; MG/1
100 CAPSULE ORAL 2 TIMES DAILY
Qty: 10 CAPSULE | Refills: 0 | Status: SHIPPED | OUTPATIENT
Start: 2023-11-23 | End: 2023-11-28

## 2023-11-23 RX ORDER — MORPHINE SULFATE 2 MG/ML
2 INJECTION, SOLUTION INTRAMUSCULAR; INTRAVENOUS
Status: COMPLETED | OUTPATIENT
Start: 2023-11-24 | End: 2023-11-24

## 2023-11-23 RX ADMIN — SODIUM CHLORIDE 500 ML: 9 INJECTION, SOLUTION INTRAVENOUS at 10:11

## 2023-11-24 PROBLEM — N39.0 UTI (URINARY TRACT INFECTION): Status: ACTIVE | Noted: 2023-11-24

## 2023-11-24 PROBLEM — R79.89 ELEVATED D-DIMER: Status: ACTIVE | Noted: 2023-11-24

## 2023-11-24 PROBLEM — E86.0 DEHYDRATION: Status: ACTIVE | Noted: 2023-11-24

## 2023-11-24 PROBLEM — C34.92 MALIGNANT NEOPLASM OF LEFT LUNG: Status: ACTIVE | Noted: 2023-11-24

## 2023-11-24 LAB
ANION GAP SERPL CALCULATED.3IONS-SCNC: 12 MMOL/L (ref 7–16)
BASOPHILS # BLD AUTO: 0.06 K/UL (ref 0–0.2)
BASOPHILS NFR BLD AUTO: 0.6 % (ref 0–1)
BUN SERPL-MCNC: 10 MG/DL (ref 7–18)
BUN/CREAT SERPL: 20 (ref 6–20)
CALCIUM SERPL-MCNC: 9.7 MG/DL (ref 8.5–10.1)
CHLORIDE SERPL-SCNC: 98 MMOL/L (ref 98–107)
CO2 SERPL-SCNC: 27 MMOL/L (ref 21–32)
CREAT SERPL-MCNC: 0.51 MG/DL (ref 0.55–1.02)
DIFFERENTIAL METHOD BLD: ABNORMAL
EGFR (NO RACE VARIABLE) (RUSH/TITUS): 114 ML/MIN/1.73M2
EOSINOPHIL # BLD AUTO: 0.13 K/UL (ref 0–0.5)
EOSINOPHIL NFR BLD AUTO: 1.2 % (ref 1–4)
ERYTHROCYTE [DISTWIDTH] IN BLOOD BY AUTOMATED COUNT: 14.7 % (ref 11.5–14.5)
GLUCOSE SERPL-MCNC: 130 MG/DL (ref 74–106)
HCT VFR BLD AUTO: 33.9 % (ref 38–47)
HGB BLD-MCNC: 10.9 G/DL (ref 12–16)
IMM GRANULOCYTES # BLD AUTO: 0.05 K/UL (ref 0–0.04)
IMM GRANULOCYTES NFR BLD: 0.5 % (ref 0–0.4)
LYMPHOCYTES # BLD AUTO: 1.01 K/UL (ref 1–4.8)
LYMPHOCYTES NFR BLD AUTO: 9.6 % (ref 27–41)
MCH RBC QN AUTO: 26.7 PG (ref 27–31)
MCHC RBC AUTO-ENTMCNC: 32.2 G/DL (ref 32–36)
MCV RBC AUTO: 83.1 FL (ref 80–96)
MONOCYTES # BLD AUTO: 1.16 K/UL (ref 0–0.8)
MONOCYTES NFR BLD AUTO: 11 % (ref 2–6)
MPC BLD CALC-MCNC: 10.3 FL (ref 9.4–12.4)
NEUTROPHILS # BLD AUTO: 8.12 K/UL (ref 1.8–7.7)
NEUTROPHILS NFR BLD AUTO: 77.1 % (ref 53–65)
NRBC # BLD AUTO: 0 X10E3/UL
NRBC, AUTO (.00): 0 %
PLATELET # BLD AUTO: 324 K/UL (ref 150–400)
POTASSIUM SERPL-SCNC: 3.5 MMOL/L (ref 3.5–5.1)
RBC # BLD AUTO: 4.08 M/UL (ref 4.2–5.4)
SARS-COV-2 RDRP RESP QL NAA+PROBE: NEGATIVE
SODIUM SERPL-SCNC: 133 MMOL/L (ref 136–145)
TROPONIN I SERPL DL<=0.01 NG/ML-MCNC: 4.2 PG/ML
WBC # BLD AUTO: 10.53 K/UL (ref 4.5–11)

## 2023-11-24 PROCEDURE — 25000003 PHARM REV CODE 250

## 2023-11-24 PROCEDURE — 63600175 PHARM REV CODE 636 W HCPCS: Performed by: NURSE PRACTITIONER

## 2023-11-24 PROCEDURE — 96376 TX/PRO/DX INJ SAME DRUG ADON: CPT

## 2023-11-24 PROCEDURE — 99900035 HC TECH TIME PER 15 MIN (STAT)

## 2023-11-24 PROCEDURE — 99223 PR INITIAL HOSPITAL CARE,LEVL III: ICD-10-PCS | Mod: ,,, | Performed by: INTERNAL MEDICINE

## 2023-11-24 PROCEDURE — 99285 EMERGENCY DEPT VISIT HI MDM: CPT | Mod: 25

## 2023-11-24 PROCEDURE — 96374 THER/PROPH/DIAG INJ IV PUSH: CPT

## 2023-11-24 PROCEDURE — 63600175 PHARM REV CODE 636 W HCPCS: Performed by: FAMILY MEDICINE

## 2023-11-24 PROCEDURE — 63600175 PHARM REV CODE 636 W HCPCS

## 2023-11-24 PROCEDURE — 94761 N-INVAS EAR/PLS OXIMETRY MLT: CPT

## 2023-11-24 PROCEDURE — 99223 1ST HOSP IP/OBS HIGH 75: CPT | Mod: ,,, | Performed by: INTERNAL MEDICINE

## 2023-11-24 PROCEDURE — 27000221 HC OXYGEN, UP TO 24 HOURS

## 2023-11-24 PROCEDURE — 87635 SARS-COV-2 COVID-19 AMP PRB: CPT

## 2023-11-24 PROCEDURE — 80048 BASIC METABOLIC PNL TOTAL CA: CPT

## 2023-11-24 PROCEDURE — 11000001 HC ACUTE MED/SURG PRIVATE ROOM

## 2023-11-24 PROCEDURE — 84484 ASSAY OF TROPONIN QUANT: CPT | Performed by: NURSE PRACTITIONER

## 2023-11-24 PROCEDURE — 96375 TX/PRO/DX INJ NEW DRUG ADDON: CPT

## 2023-11-24 PROCEDURE — 25500020 PHARM REV CODE 255: Performed by: FAMILY MEDICINE

## 2023-11-24 PROCEDURE — 85025 COMPLETE CBC W/AUTO DIFF WBC: CPT

## 2023-11-24 RX ORDER — HYDROMORPHONE HYDROCHLORIDE 2 MG/ML
2 INJECTION, SOLUTION INTRAMUSCULAR; INTRAVENOUS; SUBCUTANEOUS
Status: COMPLETED | OUTPATIENT
Start: 2023-11-24 | End: 2023-11-24

## 2023-11-24 RX ORDER — CHLORPROMAZINE HYDROCHLORIDE 25 MG/ML
25 INJECTION INTRAMUSCULAR 4 TIMES DAILY PRN
Status: CANCELLED | OUTPATIENT
Start: 2023-11-24

## 2023-11-24 RX ORDER — ONDANSETRON 2 MG/ML
8 INJECTION INTRAMUSCULAR; INTRAVENOUS EVERY 6 HOURS PRN
Status: DISCONTINUED | OUTPATIENT
Start: 2023-11-24 | End: 2023-11-25

## 2023-11-24 RX ORDER — GUAIFENESIN 100 MG/5ML
200 SOLUTION ORAL EVERY 4 HOURS
Status: DISCONTINUED | OUTPATIENT
Start: 2023-11-24 | End: 2023-11-26 | Stop reason: HOSPADM

## 2023-11-24 RX ORDER — METOCLOPRAMIDE HYDROCHLORIDE 5 MG/ML
10 INJECTION INTRAMUSCULAR; INTRAVENOUS EVERY 6 HOURS PRN
Status: DISCONTINUED | OUTPATIENT
Start: 2023-11-24 | End: 2023-11-25

## 2023-11-24 RX ORDER — CIPROFLOXACIN 2 MG/ML
400 INJECTION, SOLUTION INTRAVENOUS
Status: DISCONTINUED | OUTPATIENT
Start: 2023-11-24 | End: 2023-11-26 | Stop reason: HOSPADM

## 2023-11-24 RX ORDER — ONDANSETRON 2 MG/ML
4 INJECTION INTRAMUSCULAR; INTRAVENOUS
Status: COMPLETED | OUTPATIENT
Start: 2023-11-24 | End: 2023-11-24

## 2023-11-24 RX ORDER — SODIUM CHLORIDE, SODIUM LACTATE, POTASSIUM CHLORIDE, CALCIUM CHLORIDE 600; 310; 30; 20 MG/100ML; MG/100ML; MG/100ML; MG/100ML
INJECTION, SOLUTION INTRAVENOUS CONTINUOUS
Status: DISPENSED | OUTPATIENT
Start: 2023-11-24 | End: 2023-11-24

## 2023-11-24 RX ORDER — ENOXAPARIN SODIUM 100 MG/ML
1 INJECTION SUBCUTANEOUS ONCE
Status: COMPLETED | OUTPATIENT
Start: 2023-11-24 | End: 2023-11-24

## 2023-11-24 RX ORDER — METOCLOPRAMIDE HYDROCHLORIDE 5 MG/ML
10 INJECTION INTRAMUSCULAR; INTRAVENOUS ONCE
Status: COMPLETED | OUTPATIENT
Start: 2023-11-24 | End: 2023-11-24

## 2023-11-24 RX ORDER — GLUCAGON 1 MG
1 KIT INJECTION
Status: DISCONTINUED | OUTPATIENT
Start: 2023-11-24 | End: 2023-11-26 | Stop reason: HOSPADM

## 2023-11-24 RX ORDER — IBUPROFEN 200 MG
24 TABLET ORAL
Status: DISCONTINUED | OUTPATIENT
Start: 2023-11-24 | End: 2023-11-26 | Stop reason: HOSPADM

## 2023-11-24 RX ORDER — SODIUM CHLORIDE 0.9 % (FLUSH) 0.9 %
10 SYRINGE (ML) INJECTION EVERY 12 HOURS PRN
Status: DISCONTINUED | OUTPATIENT
Start: 2023-11-24 | End: 2023-11-26 | Stop reason: HOSPADM

## 2023-11-24 RX ORDER — HYDROMORPHONE HYDROCHLORIDE 2 MG/ML
1 INJECTION, SOLUTION INTRAMUSCULAR; INTRAVENOUS; SUBCUTANEOUS EVERY 6 HOURS PRN
Status: DISCONTINUED | OUTPATIENT
Start: 2023-11-24 | End: 2023-11-25

## 2023-11-24 RX ORDER — IBUPROFEN 200 MG
16 TABLET ORAL
Status: DISCONTINUED | OUTPATIENT
Start: 2023-11-24 | End: 2023-11-26 | Stop reason: HOSPADM

## 2023-11-24 RX ORDER — NALOXONE HCL 0.4 MG/ML
0.02 VIAL (ML) INJECTION
Status: DISCONTINUED | OUTPATIENT
Start: 2023-11-24 | End: 2023-11-26 | Stop reason: HOSPADM

## 2023-11-24 RX ADMIN — GUAIFENESIN 200 MG: 200 SOLUTION ORAL at 06:11

## 2023-11-24 RX ADMIN — HYDROMORPHONE HYDROCHLORIDE 1 MG: 2 INJECTION INTRAMUSCULAR; INTRAVENOUS; SUBCUTANEOUS at 02:11

## 2023-11-24 RX ADMIN — CIPROFLOXACIN 400 MG: 2 INJECTION, SOLUTION INTRAVENOUS at 04:11

## 2023-11-24 RX ADMIN — METOCLOPRAMIDE 10 MG: 5 INJECTION, SOLUTION INTRAMUSCULAR; INTRAVENOUS at 09:11

## 2023-11-24 RX ADMIN — GUAIFENESIN 200 MG: 200 SOLUTION ORAL at 03:11

## 2023-11-24 RX ADMIN — HYDROMORPHONE HYDROCHLORIDE 2 MG: 2 INJECTION INTRAMUSCULAR; INTRAVENOUS; SUBCUTANEOUS at 01:11

## 2023-11-24 RX ADMIN — HYDROMORPHONE HYDROCHLORIDE 1 MG: 2 INJECTION INTRAMUSCULAR; INTRAVENOUS; SUBCUTANEOUS at 09:11

## 2023-11-24 RX ADMIN — ONDANSETRON 8 MG: 2 INJECTION INTRAMUSCULAR; INTRAVENOUS at 07:11

## 2023-11-24 RX ADMIN — ONDANSETRON 8 MG: 2 INJECTION INTRAMUSCULAR; INTRAVENOUS at 05:11

## 2023-11-24 RX ADMIN — MORPHINE SULFATE 2 MG: 2 INJECTION, SOLUTION INTRAMUSCULAR; INTRAVENOUS at 12:11

## 2023-11-24 RX ADMIN — ONDANSETRON 4 MG: 2 INJECTION INTRAMUSCULAR; INTRAVENOUS at 01:11

## 2023-11-24 RX ADMIN — ONDANSETRON 4 MG: 2 INJECTION INTRAMUSCULAR; INTRAVENOUS at 12:11

## 2023-11-24 RX ADMIN — SODIUM CHLORIDE, POTASSIUM CHLORIDE, SODIUM LACTATE AND CALCIUM CHLORIDE: 600; 310; 30; 20 INJECTION, SOLUTION INTRAVENOUS at 04:11

## 2023-11-24 RX ADMIN — CIPROFLOXACIN 400 MG: 2 INJECTION, SOLUTION INTRAVENOUS at 05:11

## 2023-11-24 RX ADMIN — IOPAMIDOL 100 ML: 755 INJECTION, SOLUTION INTRAVENOUS at 01:11

## 2023-11-24 RX ADMIN — ENOXAPARIN SODIUM 80 MG: 100 INJECTION SUBCUTANEOUS at 04:11

## 2023-11-24 NOTE — ASSESSMENT & PLAN NOTE
- CT pending over-read, cannot rule out esophageal obstruction 2/2 metastasis at this time  - GI consulted, appreciate their expertise -- awaiting recommendations

## 2023-11-24 NOTE — ASSESSMENT & PLAN NOTE
- Pt unable to swallow prescribed macrobid  - cultures show pansensitive E. Coli  - Pt noted to have angioedema with cephlexin  - Will start Cipro

## 2023-11-24 NOTE — ED TRIAGE NOTES
EMS from home with c/o epigastric pain when breathing, decreased intake, nausea and vomiting. Recently diagnosed with lung cancer in September. Also c/o some shortness of breath.

## 2023-11-24 NOTE — HPI
"Pt is a 47 yo female who presented to [facility] ED with a cc of "chest pain, nausea, vomiting, and decreased intake." Pt states that she was diagnosed with 2 types of lung cancer (oncologist in [place]) and over the past 2 months has had these symptoms. She states that last night the pain and nausea became worse and she decided to come to the ED. She states that the chest pain is worse with deep breaths but does have some radiation to the left side of her chest. She states that she thinks she needs her esophagus stretched. Pt also endorses "foamy vomit" over the past 2 days.  who is present in room states that patient has not been able to eat since September. Of note, pt recently diagnosed with UTI and has been unable to tolerate PO abx.     Significant Findings in the ED include:  - Vitals: Temp 97.9, /96, HR 94, RR 20, SpO2 100%  - Labs: WBC 11.41, H&H 11.7/36.3, MCV 82.9, Plt 330. Na 133, K 3.8, Cl 96, BUN 10, Cr 0.47, eGFR 116. proBNP 189, Trop I <4 and 4.2 respectively. D-Dimer 2.58  - Imaging: CXR and CTPE currently pending over-read, however diffuse opacities noted to left lung  - Interventions: 500 cc NaCl    Pt was subsequently admitted to inpatient hospitalist services under the direct supervision of Dr. Dias for further evaluation and management of dysphagia.   "

## 2023-11-24 NOTE — PLAN OF CARE
PT A/O x4. Has complaints of intermittent n/v. PT is ambulatory independently and has telemetry on, on FLD. Will continue to monitor and assess.     Problem: Adult Inpatient Plan of Care  Goal: Plan of Care Review  Outcome: Ongoing, Progressing  Goal: Patient-Specific Goal (Individualized)  Outcome: Ongoing, Progressing  Goal: Absence of Hospital-Acquired Illness or Injury  Outcome: Ongoing, Progressing  Goal: Optimal Comfort and Wellbeing  Outcome: Ongoing, Progressing  Goal: Readiness for Transition of Care  Outcome: Ongoing, Progressing     Problem: Skin Injury Risk Increased  Goal: Skin Health and Integrity  Outcome: Ongoing, Progressing

## 2023-11-24 NOTE — SUBJECTIVE & OBJECTIVE
Past Medical History:   Diagnosis Date    COPD (chronic obstructive pulmonary disease)     Esophageal stricture     Hiatal hernia     Hypertension     Lung cancer        Past Surgical History:   Procedure Laterality Date     SECTION      CHOLECYSTECTOMY      DILATION AND CURETTAGE OF UTERUS      EYE SURGERY Left     HIP SURGERY Left     PLATE    JOINT REPLACEMENT Left     HIP    LEG SURGERY Left     TONSILLECTOMY         Review of patient's allergies indicates:   Allergen Reactions    Cephalexin Swelling       No current facility-administered medications on file prior to encounter.     Current Outpatient Medications on File Prior to Encounter   Medication Sig    albuterol (PROVENTIL/VENTOLIN HFA) 90 mcg/actuation inhaler Inhale 2 puffs into the lungs every 4 (four) hours as needed.    gabapentin (NEURONTIN) 100 MG capsule Take 3 capsules (300 mg total) by mouth 3 (three) times daily.    ibuprofen (ADVIL,MOTRIN) 800 MG tablet Take 800 mg by mouth 2 (two) times daily as needed.    omeprazole (PRILOSEC) 20 MG capsule Take 20 mg by mouth once daily.    [] ondansetron (ZOFRAN-ODT) 4 MG TbDL Take 1 tablet (4 mg total) by mouth every 6 (six) hours as needed (Nausea or vomiting).     Family History       Problem Relation (Age of Onset)    No Known Problems Mother, Father          Tobacco Use    Smoking status: Former     Current packs/day: 0.50     Types: Cigarettes    Smokeless tobacco: Never   Substance and Sexual Activity    Alcohol use: Never    Drug use: Never    Sexual activity: Yes     Birth control/protection: None     Review of Systems   Constitutional:  Positive for appetite change and fatigue. Negative for chills, diaphoresis and fever.   HENT:  Positive for trouble swallowing. Negative for congestion, rhinorrhea, sinus pressure, sinus pain and sore throat.    Respiratory:  Positive for shortness of breath. Negative for cough, chest tightness and wheezing.    Cardiovascular:  Positive for chest  pain. Negative for palpitations and leg swelling.   Gastrointestinal:  Positive for nausea and vomiting. Negative for abdominal distention, abdominal pain, constipation and diarrhea.   Musculoskeletal:  Negative for arthralgias and myalgias.   Skin:  Negative for rash and wound.   Neurological:  Positive for weakness. Negative for dizziness, syncope, light-headedness and headaches.   All other systems reviewed and are negative.    Objective:     Vital Signs (Most Recent):  Temp: 97.9 °F (36.6 °C) (11/23/23 2230)  Pulse: (!) 127 (11/24/23 0124)  Resp: 14 (11/24/23 0128)  BP: (!) 146/95 (11/23/23 2326)  SpO2: 96 % (11/23/23 2326) Vital Signs (24h Range):  Temp:  [97.9 °F (36.6 °C)] 97.9 °F (36.6 °C)  Pulse:  [] 127  Resp:  [14-22] 14  SpO2:  [96 %-100 %] 96 %  BP: (139-150)/(92-96) 146/95     Weight: 77.1 kg (170 lb)  Body mass index is 25.85 kg/m².     Physical Exam  Vitals and nursing note reviewed.   Constitutional:       General: She is not in acute distress.     Appearance: Normal appearance. She is not ill-appearing or diaphoretic.   HENT:      Head: Normocephalic and atraumatic.      Right Ear: External ear normal.      Left Ear: External ear normal.      Nose: Nose normal.      Mouth/Throat:      Mouth: Mucous membranes are dry.      Pharynx: Oropharynx is clear.   Eyes:      General: No scleral icterus.     Conjunctiva/sclera: Conjunctivae normal.   Cardiovascular:      Rate and Rhythm: Normal rate and regular rhythm.      Pulses: Normal pulses.      Heart sounds: Normal heart sounds. No murmur heard.     No friction rub. No gallop.   Pulmonary:      Effort: Pulmonary effort is normal. No respiratory distress.      Breath sounds: Examination of the left-upper field reveals decreased breath sounds. Examination of the left-middle field reveals decreased breath sounds. Examination of the left-lower field reveals decreased breath sounds. Decreased breath sounds present. No wheezing, rhonchi or rales.    Abdominal:      General: Abdomen is flat. Bowel sounds are normal. There is no distension.      Palpations: Abdomen is soft.      Tenderness: There is no abdominal tenderness. There is no guarding or rebound.   Musculoskeletal:      Right lower leg: No edema.      Left lower leg: No edema.   Skin:     General: Skin is warm and dry.      Coloration: Skin is not jaundiced.   Neurological:      General: No focal deficit present.      Mental Status: She is alert and oriented to person, place, and time.   Psychiatric:         Mood and Affect: Mood normal.         Behavior: Behavior normal.                Significant Labs: All pertinent labs within the past 24 hours have been reviewed.  Recent Lab Results         11/24/23  0146   11/23/23  2238        Albumin/Globulin Ratio   0.5       Albumin   2.5       ALP   117       ALT   13       Anion Gap   10       AST   7       Baso #   0.05       Basophil %   0.4       BILIRUBIN TOTAL   0.4       BUN   10       BUN/CREAT RATIO   21       Calcium   9.6       Chloride   96       CO2   31       Creatinine   0.47       D-Dimer   2.58       Differential Method   Auto       eGFR   116       Eos #   0.13       Eosinophil %   1.1       Globulin, Total   5.2       Glucose   104       Hematocrit   36.3       Hemoglobin   11.7       Immature Grans (Abs)   0.05       Immature Granulocytes   0.4       Lymph #   0.99       Lymph %   8.7       MCH   26.7       MCHC   32.2       MCV   82.9       Mono #   1.07       Mono %   9.4       MPV   10.0       Neutrophils, Abs   9.12       Neutrophils Relative   80.0       nRBC   0.0       NT-proBNP   189       NUCLEATED RBC ABSOLUTE   0.00       Platelet Count   330       Potassium   3.8       PROTEIN TOTAL   7.7       RBC   4.38       RDW   14.6       Sodium   133       Troponin I High Sensitivity 4.2   <4.0       WBC   11.41               Significant Imaging: I have reviewed all pertinent imaging results/findings within the past 24 hours.

## 2023-11-24 NOTE — PLAN OF CARE
[facility]  Initial Discharge Assessment       Primary Care Provider: Justin Bolton MD    Admission Diagnosis: Chest pain [R07.9]  Dysphagia, unspecified type [R13.10]    Admission Date: 11/23/2023  Expected Discharge Date:     Transition of Care Barriers: None    [place] / [place] [facility] [facility] / Product Type: Government /     Extended Emergency Contact Information  Primary Emergency Contact: Tyler Ontiveros   [facility]  Home Phone: [phone]  Relation: Father    Discharge Plan A: Home with family  Discharge Plan B: Home with family      [facility] - [address]  [address] [facility]  [place] [address]  Phone: [phone] Fax: [phone]      Initial Assessment (most recent)       Adult Discharge Assessment - 11/24/23 1020          Discharge Assessment    Assessment Type Discharge Planning Assessment     Source of Information patient     Communicated ZURDO with patient/caregiver Date not available/Unable to determine     People in Home --   ex     Do you expect to return to your current living situation? Yes     Do you have help at home or someone to help you manage your care at home? Yes     Prior to hospitilization cognitive status: Unable to Assess     Current cognitive status: Alert/Oriented     Walking or Climbing Stairs --   none    Dressing/Bathing --   none    Home Accessibility stairs to enter home     Number of Stairs, Main Entrance five     Stair Railings, Main Entrance railings on both sides of stairs     Home Layout Able to live on 1st floor     Equipment Currently Used at Home none     Patient currently being followed by outpatient case management? No     Do you currently have service(s) that help you manage your care at home? No     Do you take prescription medications? Yes     Do you have prescription coverage? Yes     Coverage [place] Medicaid     Do you have any problems affording any of your  prescribed medications? No     Is the patient taking medications as prescribed? yes     Who is going to help you get home at discharge? family     How do you get to doctors appointments? car, drives self;family or friend will provide     Are you on dialysis? No     Do you take coumadin? No     DME Needed Upon Discharge  none     Discharge Plan discussed with: Patient     Transition of Care Barriers None     Discharge Plan A Home with family     Discharge Plan B Home with family        Physical Activity    On average, how many days per week do you engage in moderate to strenuous exercise (like a brisk walk)? 0 days     On average, how many minutes do you engage in exercise at this level? 0 min        Financial Resource Strain    How hard is it for you to pay for the very basics like food, housing, medical care, and heating? Not very hard        Housing Stability    In the last 12 months, was there a time when you were not able to pay the mortgage or rent on time? No     In the last 12 months, how many places have you lived? 1     In the last 12 months, was there a time when you did not have a steady place to sleep or slept in a shelter (including now)? No        Transportation Needs    In the past 12 months, has lack of transportation kept you from medical appointments or from getting medications? No     In the past 12 months, has lack of transportation kept you from meetings, work, or from getting things needed for daily living? No        Food Insecurity    Within the past 12 months, you worried that your food would run out before you got the money to buy more. Never true     Within the past 12 months, the food you bought just didn't last and you didn't have money to get more. Never true        Stress    Do you feel stress - tense, restless, nervous, or anxious, or unable to sleep at night because your mind is troubled all the time - these days? Not at all        Social Connections    In a typical week, how many  times do you talk on the phone with family, friends, or neighbors? More than three times a week     How often do you get together with friends or relatives? More than three times a week     How often do you attend Oriental orthodox or Mosque services? Never     Do you belong to any clubs or organizations such as Oriental orthodox groups, unions, fraternal or athletic groups, or school groups? No     How often do you attend meetings of the clubs or organizations you belong to? Never     Are you , , , , never , or living with a partner?         Alcohol Use    Q1: How often do you have a drink containing alcohol? Never     Q2: How many drinks containing alcohol do you have on a typical day when you are drinking? Patient does not drink     Q3: How often do you have six or more drinks on one occasion? Never                        SS spoke with pt in room. Pt lives at home with her ex . Not current with home health. No dme pta. Discharge plan is to return home with no anticipated needs. SS will follow for discharge needs as they arise.

## 2023-11-24 NOTE — ASSESSMENT & PLAN NOTE
Likely 2/2 decreased PO intake  - Received 500 cc bolus NaCl in ED  - Will give LR @ 125cc x 8 hours  - Continue to monitor

## 2023-11-24 NOTE — ED PROVIDER NOTES
Encounter Date: 2023       History     Chief Complaint   Patient presents with    Nausea    Vomiting    Chest Pain     48-year-old female presents to ED with complaint of chest pain, nausea, vomiting, decreased intake.  Patient reports she has been unable to eat/drink for several weeks with difficulty swallowing.  Patient reports any time something his the back of her throat she vomits it back up.  Patient reports she was told that she needs to have her esophagus stretched, but has not had procedure at this time.  Patient also reports having chest pain to center of her chest radiating to the left side of her chest.  Patient was diagnosed with NSCLC in September.  She reports she is being seen by oncologist in [place], but has not started treatment at this time.  Patient reports over the past 2 days, she has had gagging of foamy emesis.  Denies fever, chills.  Patient called EMS for further evaluation.  Patient received 4 mg of Zofran in route to facility with 300 mL of IV fluid.    The history is provided by the patient, the EMS personnel and the spouse.     Review of patient's allergies indicates:   Allergen Reactions    Cephalexin Swelling     Past Medical History:   Diagnosis Date    COPD (chronic obstructive pulmonary disease)     Esophageal stricture     Hiatal hernia     Hypertension     Lung cancer      Past Surgical History:   Procedure Laterality Date     SECTION      CHOLECYSTECTOMY      DILATION AND CURETTAGE OF UTERUS      EYE SURGERY Left     HIP SURGERY Left     PLATE    JOINT REPLACEMENT Left     HIP    LEG SURGERY Left     TONSILLECTOMY       History reviewed. No pertinent family history.  Social History     Tobacco Use    Smoking status: Former     Current packs/day: 0.50     Types: Cigarettes    Smokeless tobacco: Never   Substance Use Topics    Alcohol use: Never    Drug use: Never     Review of Systems   Constitutional:  Positive for activity change. Negative for chills and fever.    HENT:  Positive for trouble swallowing. Negative for congestion.    Eyes:  Negative for photophobia and visual disturbance.   Respiratory:  Positive for cough and shortness of breath.    Cardiovascular:  Positive for chest pain. Negative for palpitations.   Gastrointestinal:  Positive for nausea and vomiting.   Endocrine: Negative for cold intolerance and heat intolerance.   Genitourinary:  Negative for difficulty urinating and dysuria.   Musculoskeletal:  Negative for arthralgias and gait problem.   Skin:  Negative for color change and wound.   Neurological:  Positive for weakness. Negative for dizziness.   Hematological:  Negative for adenopathy. Does not bruise/bleed easily.   Psychiatric/Behavioral:  Negative for agitation and confusion.    All other systems reviewed and are negative.      Physical Exam     Initial Vitals   BP Pulse Resp Temp SpO2   11/23/23 2226 11/23/23 2226 11/23/23 2226 11/23/23 2230 11/23/23 2226   (!) 150/96 94 20 97.9 °F (36.6 °C) 100 %      MAP       --                Physical Exam    Nursing note and vitals reviewed.  Constitutional: She is not diaphoretic. No distress.   HENT:   Head: Normocephalic and atraumatic.   Eyes: EOM are normal. Pupils are equal, round, and reactive to light.   Neck:   Normal range of motion.  Cardiovascular:  Normal rate and regular rhythm.           Pulmonary/Chest: She exhibits no tenderness.   Abdominal: She exhibits no distension. There is no abdominal tenderness.   Musculoskeletal:         General: No tenderness or edema.      Cervical back: Normal range of motion.     Lymphadenopathy:     She has no cervical adenopathy.   Neurological: She is alert and oriented to person, place, and time. No cranial nerve deficit or sensory deficit.   Skin: Skin is warm and dry. Capillary refill takes less than 2 seconds.   Mottling to upper extremities   Psychiatric:   Tearful, anxious         Medical Screening Exam   See Full Note    ED Course   Procedures  Labs  Reviewed   CBC WITH DIFFERENTIAL - Abnormal; Notable for the following components:       Result Value    WBC 11.41 (*)     Hemoglobin 11.7 (*)     Hematocrit 36.3 (*)     MCH 26.7 (*)     RDW 14.6 (*)     Neutrophils % 80.0 (*)     Lymphocytes % 8.7 (*)     Monocytes % 9.4 (*)     Neutrophils, Abs 9.12 (*)     Lymphocytes, Absolute 0.99 (*)     Monocytes, Absolute 1.07 (*)     Immature Granulocytes, Absolute 0.05 (*)     All other components within normal limits   CBC W/ AUTO DIFFERENTIAL    Narrative:     The following orders were created for panel order CBC auto differential.  Procedure                               Abnormality         Status                     ---------                               -----------         ------                     CBC with Differential[[id number]]       Abnormal            Final result                 Please view results for these tests on the individual orders.   COMPREHENSIVE METABOLIC PANEL   TROPONIN I   NT-PRO NATRIURETIC PEPTIDE   D DIMER, QUANTITATIVE   EXTRA TUBES    Narrative:     The following orders were created for panel order EXTRA TUBES.  Procedure                               Abnormality         Status                     ---------                               -----------         ------                     Gold Top Hold[[id number]]                                   In process                   Please view results for these tests on the individual orders.   GOLD TOP HOLD          Imaging Results              X-Ray Chest AP Portable (In process)                      Medications   sodium chloride 0.9% bolus 500 mL 500 mL (500 mLs Intravenous New Bag 11/23/23 7112)     Medical Decision Making  Amount and/or Complexity of Data Reviewed  Labs: ordered.  Radiology: ordered.    Risk  Prescription drug management.  Decision regarding hospitalization.                          Medical Decision Making:   Initial Assessment:   Patient comes in 48-year-old who has chest pain.   And epigastric pain unable to swallow foods.  Has a history of cancer of the abdomen and the lower left lung mass.  Differential Diagnosis:   Metastasizing lesions with possible involvement of the esophagus.  ED Management:  Will admit for further evaluation          Clinical Impression:   Final diagnoses:  [R07.9] Chest pain               Kin Reyes, DO  11/24/23 0555

## 2023-11-24 NOTE — H&P
"  [facility] - Emergency Department  Hospital Medicine  History & Physical    Patient Name: Viviane Seaman  MRN: [id number]  Patient Class: IP- Inpatient  Admission Date: 11/23/2023  Attending Physician: Hong Dias MD   Primary Care Provider: Justin Bolton MD         Patient information was obtained from patient, relative(s), past medical records, and ER records.     Subjective:     Principal Problem:Dysphagia    Chief Complaint:   Chief Complaint   Patient presents with    Nausea    Vomiting    Chest Pain        HPI: Pt is a 49 yo female who presented to [facility] ED with a cc of "chest pain, nausea, vomiting, and decreased intake." Pt states that she was diagnosed with 2 types of lung cancer (oncologist in [place]) and over the past 2 months has had these symptoms. She states that last night the pain and nausea became worse and she decided to come to the ED. She states that the chest pain is worse with deep breaths but does have some radiation to the left side of her chest. She states that she thinks she needs her esophagus stretched. Pt also endorses "foamy vomit" over the past 2 days.  who is present in room states that patient has not been able to eat since September. Of note, pt recently diagnosed with UTI and has been unable to tolerate PO abx.     Significant Findings in the ED include:  - Vitals: Temp 97.9, /96, HR 94, RR 20, SpO2 100%  - Labs: WBC 11.41, H&H 11.7/36.3, MCV 82.9, Plt 330. Na 133, K 3.8, Cl 96, BUN 10, Cr 0.47, eGFR 116. proBNP 189, Trop I <4 and 4.2 respectively. D-Dimer 2.58  - Imaging: CXR and CTPE currently pending over-read, however diffuse opacities noted to left lung  - Interventions: 500 cc NaCl    Pt was subsequently admitted to inpatient hospitalist services under the direct supervision of Dr. Dias for further evaluation and management of dysphagia.     Past Medical History:   Diagnosis Date    COPD (chronic obstructive pulmonary disease)     " Esophageal stricture     Hiatal hernia     Hypertension     Lung cancer        Past Surgical History:   Procedure Laterality Date     SECTION      CHOLECYSTECTOMY      DILATION AND CURETTAGE OF UTERUS      EYE SURGERY Left     HIP SURGERY Left     PLATE    JOINT REPLACEMENT Left     HIP    LEG SURGERY Left     TONSILLECTOMY         Review of patient's allergies indicates:   Allergen Reactions    Cephalexin Swelling       No current facility-administered medications on file prior to encounter.     Current Outpatient Medications on File Prior to Encounter   Medication Sig    albuterol (PROVENTIL/VENTOLIN HFA) 90 mcg/actuation inhaler Inhale 2 puffs into the lungs every 4 (four) hours as needed.    gabapentin (NEURONTIN) 100 MG capsule Take 3 capsules (300 mg total) by mouth 3 (three) times daily.    ibuprofen (ADVIL,MOTRIN) 800 MG tablet Take 800 mg by mouth 2 (two) times daily as needed.    omeprazole (PRILOSEC) 20 MG capsule Take 20 mg by mouth once daily.    [] ondansetron (ZOFRAN-ODT) 4 MG TbDL Take 1 tablet (4 mg total) by mouth every 6 (six) hours as needed (Nausea or vomiting).     Family History       Problem Relation (Age of Onset)    No Known Problems Mother, Father          Tobacco Use    Smoking status: Former     Current packs/day: 0.50     Types: Cigarettes    Smokeless tobacco: Never   Substance and Sexual Activity    Alcohol use: Never    Drug use: Never    Sexual activity: Yes     Birth control/protection: None     Review of Systems   Constitutional:  Positive for appetite change and fatigue. Negative for chills, diaphoresis and fever.   HENT:  Positive for trouble swallowing. Negative for congestion, rhinorrhea, sinus pressure, sinus pain and sore throat.    Respiratory:  Positive for shortness of breath. Negative for cough, chest tightness and wheezing.    Cardiovascular:  Positive for chest pain. Negative for palpitations and leg swelling.   Gastrointestinal:  Positive for nausea and  vomiting. Negative for abdominal distention, abdominal pain, constipation and diarrhea.   Musculoskeletal:  Negative for arthralgias and myalgias.   Skin:  Negative for rash and wound.   Neurological:  Positive for weakness. Negative for dizziness, syncope, light-headedness and headaches.   All other systems reviewed and are negative.    Objective:     Vital Signs (Most Recent):  Temp: 97.9 °F (36.6 °C) (11/23/23 2230)  Pulse: (!) 127 (11/24/23 0124)  Resp: 14 (11/24/23 0128)  BP: (!) 146/95 (11/23/23 2326)  SpO2: 96 % (11/23/23 2326) Vital Signs (24h Range):  Temp:  [97.9 °F (36.6 °C)] 97.9 °F (36.6 °C)  Pulse:  [] 127  Resp:  [14-22] 14  SpO2:  [96 %-100 %] 96 %  BP: (139-150)/(92-96) 146/95     Weight: 77.1 kg (170 lb)  Body mass index is 25.85 kg/m².     Physical Exam  Vitals and nursing note reviewed.   Constitutional:       General: She is not in acute distress.     Appearance: Normal appearance. She is not ill-appearing or diaphoretic.   HENT:      Head: Normocephalic and atraumatic.      Right Ear: External ear normal.      Left Ear: External ear normal.      Nose: Nose normal.      Mouth/Throat:      Mouth: Mucous membranes are dry.      Pharynx: Oropharynx is clear.   Eyes:      General: No scleral icterus.     Conjunctiva/sclera: Conjunctivae normal.   Cardiovascular:      Rate and Rhythm: Normal rate and regular rhythm.      Pulses: Normal pulses.      Heart sounds: Normal heart sounds. No murmur heard.     No friction rub. No gallop.   Pulmonary:      Effort: Pulmonary effort is normal. No respiratory distress.      Breath sounds: Examination of the left-upper field reveals decreased breath sounds. Examination of the left-middle field reveals decreased breath sounds. Examination of the left-lower field reveals decreased breath sounds. Decreased breath sounds present. No wheezing, rhonchi or rales.   Abdominal:      General: Abdomen is flat. Bowel sounds are normal. There is no distension.       Palpations: Abdomen is soft.      Tenderness: There is no abdominal tenderness. There is no guarding or rebound.   Musculoskeletal:      Right lower leg: No edema.      Left lower leg: No edema.   Skin:     General: Skin is warm and dry.      Coloration: Skin is not jaundiced.   Neurological:      General: No focal deficit present.      Mental Status: She is alert and oriented to person, place, and time.   Psychiatric:         Mood and Affect: Mood normal.         Behavior: Behavior normal.                Significant Labs: All pertinent labs within the past 24 hours have been reviewed.  Recent Lab Results         11/24/23  0146   11/23/23  2238        Albumin/Globulin Ratio   0.5       Albumin   2.5       ALP   117       ALT   13       Anion Gap   10       AST   7       Baso #   0.05       Basophil %   0.4       BILIRUBIN TOTAL   0.4       BUN   10       BUN/CREAT RATIO   21       Calcium   9.6       Chloride   96       CO2   31       Creatinine   0.47       D-Dimer   2.58       Differential Method   Auto       eGFR   116       Eos #   0.13       Eosinophil %   1.1       Globulin, Total   5.2       Glucose   104       Hematocrit   36.3       Hemoglobin   11.7       Immature Grans (Abs)   0.05       Immature Granulocytes   0.4       Lymph #   0.99       Lymph %   8.7       MCH   26.7       MCHC   32.2       MCV   82.9       Mono #   1.07       Mono %   9.4       MPV   10.0       Neutrophils, Abs   9.12       Neutrophils Relative   80.0       nRBC   0.0       NT-proBNP   189       NUCLEATED RBC ABSOLUTE   0.00       Platelet Count   330       Potassium   3.8       PROTEIN TOTAL   7.7       RBC   4.38       RDW   14.6       Sodium   133       Troponin I High Sensitivity 4.2   <4.0       WBC   11.41               Significant Imaging: I have reviewed all pertinent imaging results/findings within the past 24 hours.  Assessment/Plan:     * Dysphagia  - CT pending over-read, cannot rule out esophageal obstruction 2/2  metastasis at this time  - GI consulted, appreciate their expertise -- awaiting recommendations    UTI (urinary tract infection)  - Pt unable to swallow prescribed macrobid  - cultures show pansensitive E. Coli  - Pt noted to have angioedema with cephlexin  - Will start Cipro      Elevated d-dimer  - CT PE pending over-read  - Wells Score moderate likelyhood  - Will start Therapeutic lovenox 1mg/kg x1 at this time, re-evaluate once CT results.       Dehydration  Likely 2/2 decreased PO intake  - Received 500 cc bolus NaCl in ED  - Will give LR @ 125cc x 8 hours  - Continue to monitor      Malignant neoplasm of left lung  Squamous cell and adenocarcinoma of Left Lung  - Oncologist in [place]  - Pulm consulted, appreciate their expertise -- awaiting recommendations for extrinsic compression of pulmonary artery 2/2 lung mass      VTE Risk Mitigation (From admission, onward)           Ordered     enoxaparin injection 80 mg  Once         11/24/23 0348     IP VTE LOW RISK PATIENT  Once         11/24/23 0320     Place sequential compression device  Until discontinued         11/24/23 0320                                    Bijan Gandara MD  Department of Hospital Medicine  [facility] - Emergency Department

## 2023-11-24 NOTE — ASSESSMENT & PLAN NOTE
- CT PE pending over-read  - Wells Score moderate likelyhood  - Will start Therapeutic lovenox 1mg/kg x1 at this time, re-evaluate once CT results.

## 2023-11-24 NOTE — ASSESSMENT & PLAN NOTE
Squamous cell and adenocarcinoma of Left Lung  - Oncologist in [place]  - CT chest on 11/24 showed    tumor thrombus extending into the SVC. 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   -Dysphagia is secondary to the extrinsic mass compressing the oesophagus.Patient needs to be seen by Oncologist. Oncology consult is scheduled on 11/ 27/23.  - Patient and her  provided with disc of CT Chest findings ( to be taken for Oncology follow up on 11/27/23)  -Will continue with symptomatic management.

## 2023-11-25 LAB
25(OH)D3 SERPL-MCNC: 11.1 NG/ML
ANION GAP SERPL CALCULATED.3IONS-SCNC: 9 MMOL/L (ref 7–16)
BASOPHILS # BLD AUTO: 0.06 K/UL (ref 0–0.2)
BASOPHILS NFR BLD AUTO: 0.4 % (ref 0–1)
BUN SERPL-MCNC: 7 MG/DL (ref 7–18)
BUN/CREAT SERPL: 15 (ref 6–20)
CALCIUM SERPL-MCNC: 10 MG/DL (ref 8.5–10.1)
CHLORIDE SERPL-SCNC: 96 MMOL/L (ref 98–107)
CO2 SERPL-SCNC: 29 MMOL/L (ref 21–32)
CREAT SERPL-MCNC: 0.46 MG/DL (ref 0.55–1.02)
DIFFERENTIAL METHOD BLD: ABNORMAL
EGFR (NO RACE VARIABLE) (RUSH/TITUS): 117 ML/MIN/1.73M2
EOSINOPHIL # BLD AUTO: 0.08 K/UL (ref 0–0.5)
EOSINOPHIL NFR BLD AUTO: 0.6 % (ref 1–4)
ERYTHROCYTE [DISTWIDTH] IN BLOOD BY AUTOMATED COUNT: 14.6 % (ref 11.5–14.5)
GLUCOSE SERPL-MCNC: 119 MG/DL (ref 74–106)
HCT VFR BLD AUTO: 35.8 % (ref 38–47)
HGB BLD-MCNC: 11.5 G/DL (ref 12–16)
IMM GRANULOCYTES # BLD AUTO: 0.07 K/UL (ref 0–0.04)
IMM GRANULOCYTES NFR BLD: 0.5 % (ref 0–0.4)
LYMPHOCYTES # BLD AUTO: 0.46 K/UL (ref 1–4.8)
LYMPHOCYTES NFR BLD AUTO: 3.3 % (ref 27–41)
MCH RBC QN AUTO: 26.6 PG (ref 27–31)
MCHC RBC AUTO-ENTMCNC: 32.1 G/DL (ref 32–36)
MCV RBC AUTO: 82.9 FL (ref 80–96)
MONOCYTES # BLD AUTO: 0.51 K/UL (ref 0–0.8)
MONOCYTES NFR BLD AUTO: 3.6 % (ref 2–6)
MPC BLD CALC-MCNC: 10 FL (ref 9.4–12.4)
NEUTROPHILS # BLD AUTO: 12.81 K/UL (ref 1.8–7.7)
NEUTROPHILS NFR BLD AUTO: 91.6 % (ref 53–65)
NRBC # BLD AUTO: 0 X10E3/UL
NRBC, AUTO (.00): 0 %
PLATELET # BLD AUTO: 333 K/UL (ref 150–400)
POTASSIUM SERPL-SCNC: 3.9 MMOL/L (ref 3.5–5.1)
RBC # BLD AUTO: 4.32 M/UL (ref 4.2–5.4)
SODIUM SERPL-SCNC: 130 MMOL/L (ref 136–145)
T4 SERPL-MCNC: 10.5 ΜG/DL (ref 4.8–13.9)
TSH SERPL DL<=0.005 MIU/L-ACNC: 2.32 UIU/ML (ref 0.36–3.74)
WBC # BLD AUTO: 13.99 K/UL (ref 4.5–11)

## 2023-11-25 PROCEDURE — 94761 N-INVAS EAR/PLS OXIMETRY MLT: CPT

## 2023-11-25 PROCEDURE — 84443 ASSAY THYROID STIM HORMONE: CPT | Performed by: HOSPITALIST

## 2023-11-25 PROCEDURE — 85025 COMPLETE CBC W/AUTO DIFF WBC: CPT

## 2023-11-25 PROCEDURE — 99900035 HC TECH TIME PER 15 MIN (STAT)

## 2023-11-25 PROCEDURE — 84436 ASSAY OF TOTAL THYROXINE: CPT | Performed by: HOSPITALIST

## 2023-11-25 PROCEDURE — 11000001 HC ACUTE MED/SURG PRIVATE ROOM

## 2023-11-25 PROCEDURE — 27000221 HC OXYGEN, UP TO 24 HOURS

## 2023-11-25 PROCEDURE — 63600175 PHARM REV CODE 636 W HCPCS

## 2023-11-25 PROCEDURE — 82306 VITAMIN D 25 HYDROXY: CPT | Performed by: HOSPITALIST

## 2023-11-25 PROCEDURE — 80048 BASIC METABOLIC PNL TOTAL CA: CPT

## 2023-11-25 PROCEDURE — 63600175 PHARM REV CODE 636 W HCPCS: Performed by: INTERNAL MEDICINE

## 2023-11-25 PROCEDURE — 25000003 PHARM REV CODE 250

## 2023-11-25 PROCEDURE — 63600175 PHARM REV CODE 636 W HCPCS: Performed by: HOSPITALIST

## 2023-11-25 PROCEDURE — 99233 SBSQ HOSP IP/OBS HIGH 50: CPT | Mod: GC,,, | Performed by: HOSPITALIST

## 2023-11-25 PROCEDURE — 99233 PR SUBSEQUENT HOSPITAL CARE,LEVL III: ICD-10-PCS | Mod: GC,,, | Performed by: HOSPITALIST

## 2023-11-25 RX ORDER — HYDROCODONE BITARTRATE AND ACETAMINOPHEN 7.5; 325 MG/15ML; MG/15ML
15 SOLUTION ORAL EVERY 6 HOURS PRN
Status: DISCONTINUED | OUTPATIENT
Start: 2023-11-25 | End: 2023-11-26 | Stop reason: HOSPADM

## 2023-11-25 RX ORDER — ONDANSETRON HYDROCHLORIDE 4 MG/5ML
8 SOLUTION ORAL EVERY 6 HOURS PRN
Status: DISCONTINUED | OUTPATIENT
Start: 2023-11-25 | End: 2023-11-26 | Stop reason: HOSPADM

## 2023-11-25 RX ORDER — METOCLOPRAMIDE HYDROCHLORIDE 5 MG/ML
10 INJECTION INTRAMUSCULAR; INTRAVENOUS EVERY 6 HOURS PRN
Status: DISCONTINUED | OUTPATIENT
Start: 2023-11-25 | End: 2023-11-26 | Stop reason: HOSPADM

## 2023-11-25 RX ORDER — HYDROMORPHONE HYDROCHLORIDE 2 MG/ML
1 INJECTION, SOLUTION INTRAMUSCULAR; INTRAVENOUS; SUBCUTANEOUS ONCE
Status: COMPLETED | OUTPATIENT
Start: 2023-11-25 | End: 2023-11-25

## 2023-11-25 RX ADMIN — METHYLPREDNISOLONE SODIUM SUCCINATE 40 MG: 40 INJECTION, POWDER, LYOPHILIZED, FOR SOLUTION INTRAMUSCULAR; INTRAVENOUS at 08:11

## 2023-11-25 RX ADMIN — METOCLOPRAMIDE HYDROCHLORIDE 10 MG: 5 INJECTION INTRAMUSCULAR; INTRAVENOUS at 02:11

## 2023-11-25 RX ADMIN — HYDROCODONE BITARTRATE AND ACETAMINOPHEN 15 ML: 7.5; 325 SOLUTION ORAL at 11:11

## 2023-11-25 RX ADMIN — GUAIFENESIN 200 MG: 200 SOLUTION ORAL at 11:11

## 2023-11-25 RX ADMIN — HYDROCODONE BITARTRATE AND ACETAMINOPHEN 15 ML: 7.5; 325 SOLUTION ORAL at 12:11

## 2023-11-25 RX ADMIN — METHYLPREDNISOLONE SODIUM SUCCINATE 40 MG: 40 INJECTION, POWDER, LYOPHILIZED, FOR SOLUTION INTRAMUSCULAR; INTRAVENOUS at 12:11

## 2023-11-25 RX ADMIN — HYDROMORPHONE HYDROCHLORIDE 1 MG: 2 INJECTION INTRAMUSCULAR; INTRAVENOUS; SUBCUTANEOUS at 02:11

## 2023-11-25 RX ADMIN — GUAIFENESIN 200 MG: 200 SOLUTION ORAL at 01:11

## 2023-11-25 RX ADMIN — HYDROMORPHONE HYDROCHLORIDE 1 MG: 2 INJECTION INTRAMUSCULAR; INTRAVENOUS; SUBCUTANEOUS at 08:11

## 2023-11-25 RX ADMIN — CIPROFLOXACIN 400 MG: 2 INJECTION, SOLUTION INTRAVENOUS at 05:11

## 2023-11-25 RX ADMIN — HYDROCODONE BITARTRATE AND ACETAMINOPHEN 15 ML: 7.5; 325 SOLUTION ORAL at 05:11

## 2023-11-25 RX ADMIN — GUAIFENESIN 200 MG: 200 SOLUTION ORAL at 05:11

## 2023-11-25 RX ADMIN — ONDANSETRON HYDROCHLORIDE 8 MG: 4 SOLUTION ORAL at 11:11

## 2023-11-25 RX ADMIN — GUAIFENESIN 200 MG: 200 SOLUTION ORAL at 02:11

## 2023-11-25 NOTE — PROGRESS NOTES
See and charged earlier in day by Dr Dias.   Pt dx in Sept 23 lung CA by Dr Fragoso. Has been seen and followed in [facility] Oncology clinic. Prior two months progressive more difficult swallowing. Noted on studies large lung mass more right side with extrinsic pressure. Discussed with pulmonary and GI. Nothing to offer intervention wise here. For swallowing esophageal stent possible but not done here.   Plan keep today and hydrate. Small dose prednisone.   Discussed with pt and . Pt will need to keep appt with her oncologist she already has for Monday.

## 2023-11-25 NOTE — PROGRESS NOTES
"[facility]  [facility] Medicine  Progress Note    Patient Name: Viviane Seaman  MRN: [id number]  Patient Class: IP- Inpatient   Admission Date: 11/23/2023  Length of Stay: 1 days  Attending Physician: Nitish Acuña MD  Primary Care Provider: Justin Bolton MD        Subjective:     Principal Problem:Dysphagia        HPI:  Pt is a 47 yo female who presented to [facility] ED with a cc of "chest pain, nausea, vomiting, and decreased intake." Pt states that she was diagnosed with 2 types of lung cancer (oncologist in [place]) and over the past 2 months has had these symptoms. She states that last night the pain and nausea became worse and she decided to come to the ED. She states that the chest pain is worse with deep breaths but does have some radiation to the left side of her chest. She states that she thinks she needs her esophagus stretched. Pt also endorses "foamy vomit" over the past 2 days.  who is present in room states that patient has not been able to eat since September. Of note, pt recently diagnosed with UTI and has been unable to tolerate PO abx.     Significant Findings in the ED include:  - Vitals: Temp 97.9, /96, HR 94, RR 20, SpO2 100%  - Labs: WBC 11.41, H&H 11.7/36.3, MCV 82.9, Plt 330. Na 133, K 3.8, Cl 96, BUN 10, Cr 0.47, eGFR 116. proBNP 189, Trop I <4 and 4.2 respectively. D-Dimer 2.58  - Imaging: CXR and CTPE currently pending over-read, however diffuse opacities noted to left lung  - Interventions: 500 cc NaCl    Pt was subsequently admitted to inpatient hospitalist services under the direct supervision of Dr. Dias for further evaluation and management of dysphagia.     Overview/Hospital Course:  Patient was admitted with complain of chest pain, nausea /Vomiting and Decreased intake. Patient has a history of Lung cancer ( squamous cell and adenocarcinoma ) diagnosed on September, 2023. She sees Oncologist at [place]. As per patient and her , " treatment has not been started yet because of pending immunology result.   CT chest PE (11/24/23) - Negative for PE. There is a tumor thrombus extending into the SVC, thorasic esophagus likely invaded by tumor/malignancy. Is managed with IV fluids, IV hydromorphine as needed, IV Ondansetron.  Urine analysis positive for E.coli, started on Ciprofloxacin. GI and Pulmonary was consulted.     Overnight, patient had epigastric pain. EKG showed sinus tachycardia. Was managed with 1 mg of Hydromorphine. Patient is on full liquid diet with with boost plus TID. Patient symptom due to compression of Esophagus with  extrinsic mass. Patient needs to follow up with Oncologist for further management. The patient is provided with disc of chest CT findings. Is put on Oral Norco and Ondansetron (11/25).     Interval History:  Overnight, patient had epigastric pain. EKG showed sinus tachycardia. Was managed with 1 mg of Hydromorphine.    The patient is on full liquid diet ,patient states its hard for her to swallow and has epigastric pain. Given her history of Lung Ca, CT chest on 11/25/23 showed- tumor thrombus extending into the SVC, thoracic esophagus likely invaded by tumor/malignancy. The patient needs to be seen by her oncologist,  oncology appointment on 11/27/23. Will continue with symptomatic management meanwhile. Will try oral hydrocodone-apap for pain, oral Ondansetron for nausea and see how patient tolerates for now. The patient and her  is provided with the disc of CT chest findings ( to be taken for Oncology visit).She is planned for discharge tomorrow.    Review of Systems   Constitutional:  Positive for appetite change and fatigue. Negative for chills, diaphoresis and fever.   HENT:  Positive for trouble swallowing. Negative for congestion, rhinorrhea, sinus pressure, sinus pain and sore throat.    Respiratory:  Positive for shortness of breath. Negative for cough, chest tightness and wheezing.     Cardiovascular:  Positive for chest pain (epigastric pain). Negative for palpitations and leg swelling.   Gastrointestinal:  Positive for nausea and vomiting. Negative for abdominal distention, abdominal pain, constipation and diarrhea.   Musculoskeletal:  Negative for arthralgias and myalgias.   Skin:  Negative for rash and wound.   Neurological:  Positive for weakness. Negative for dizziness, syncope, light-headedness and headaches.   All other systems reviewed and are negative.     Objective:      Vital Signs (Most Recent):  Temp: 97.5 °F (36.4 °C) (11/25/23 0604)  Pulse: 105 (11/25/23 0604)  Resp: 16 (11/25/23 1241)  BP: 132/82 (11/25/23 0604)  SpO2: 98 % (11/25/23 0825) Vital Signs (24h Range):  Temp:  [97.5 °F (36.4 °C)-98.9 °F (37.2 °C)] 97.5 °F (36.4 °C)  Pulse:  [100-110] 105  Resp:  [15-22] 16  SpO2:  [96 %-100 %] 98 %  BP: (132-148)/() 132/82      Weight: 77 kg (169 lb 12.1 oz)  Body mass index is 25.81 kg/m².  No intake or output data in the 24 hours ending 11/25/23 1315   Physical Exam  Vitals and nursing note reviewed.   Constitutional:       General: She is not in acute distress.     Appearance: Normal appearance. She is not ill-appearing or diaphoretic.   HENT:      Head: Normocephalic and atraumatic.      Nose: Nose normal.      Mouth/Throat:      Mouth: Mucous membranes are dry.      Pharynx: Oropharynx is clear.   Eyes:      General: No scleral icterus.     Conjunctiva/sclera: Conjunctivae normal.   Cardiovascular:      Rate and Rhythm: Normal rate and regular rhythm.      Pulses: Normal pulses.      Heart sounds: Normal heart sounds. No murmur heard.     No friction rub. No gallop.   Pulmonary:      Effort: Pulmonary effort is normal. No respiratory distress.      Breath sounds: Examination of the left-upper field reveals decreased breath sounds. Examination of the left-middle field reveals decreased breath sounds. Examination of the left-lower field reveals decreased breath sounds.  Decreased breath sounds present. No wheezing, rhonchi or rales.   Abdominal:      General: Abdomen is flat. Bowel sounds are normal. There is no distension.      Palpations: Abdomen is soft.      Tenderness: There is no abdominal tenderness. There is no guarding or rebound.   Musculoskeletal:      Right lower leg: No edema.      Left lower leg: No edema.   Skin:     General: Skin is warm and dry.      Capillary Refill: Capillary refill takes less than 2 seconds.      Coloration: Skin is not jaundiced.   Neurological:      Mental Status: She is alert and oriented to person, place, and time.   Psychiatric:         Mood and Affect: Mood normal.         Behavior: Behavior normal.     Assessment/Plan:      * Dysphagia  - CT chest on 11/24 showed    tumor thrombus extending into the SVC. 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   -Dysphagia is secondary to the extrinsic mass compressing the oesophagus.Patient needs to be seen by Oncologist. Oncology consult is scheduled on 11/ 27/23.  -Patient is on Full liquid diet as tolerated.  -Continue with oral Pantoprazole.    Elevated d-dimer  - CT PE -Negative for PE        Dehydration  -Normal BUN/Creat ratio  - Continue to monitor      UTI (urinary tract infection)  - cultures show pansensitive E. Coli  -Will continue with Ciprofloxacin.      Malignant neoplasm of left lung  Squamous cell and adenocarcinoma of Left Lung  - Oncologist in [place]  - CT chest on 11/24 showed    tumor thrombus extending into the SVC. 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   -Dysphagia is secondary to the extrinsic mass compressing the oesophagus.Patient needs to be seen by Oncologist. Oncology consult is scheduled on 11/ 27/23.  - Patient and her  provided with disc of CT Chest findings ( to be taken for Oncology follow up on 11/27/23)  -Will continue with symptomatic management.      VTE Risk Mitigation (From admission, onward)           Ordered      IP VTE LOW RISK PATIENT  Once         11/24/23 0320     Place sequential compression device  Until discontinued         11/24/23 0320                    Discharge Planning   ZURDO: 11/25/2023     Code Status: Full Code   Is the patient medically ready for discharge?:     Reason for patient still in hospital (select all that apply): Treatment  Discharge Plan A: Home with family                  Sanjana Salmeron MD  Department of Hospital Medicine   [facility]

## 2023-11-25 NOTE — SUBJECTIVE & OBJECTIVE
Interval History:  Overnight, patient had epigastric pain. EKG showed sinus tachycardia. Was managed with 1 mg of Hydromorphine.    The patient is on full liquid diet ,patient states its hard for her to swallow and has epigastric pain. Given her history of Lung Ca, CT chest on 11/25/23 showed- tumor thrombus extending into the SVC, thoracic esophagus likely invaded by tumor/malignancy. The patient needs to be seen by her oncologist,  oncology appointment on 11/27/23. Will continue with symptomatic management meanwhile. Will try oral hydrocodone-apap for pain, oral Ondansetron for nausea and see how patient tolerates for now. The patient and her  is provided with the disc of CT chest findings ( to be taken for Oncology visit).She is planned for discharge tomorrow.    Review of Systems   Constitutional:  Positive for appetite change and fatigue. Negative for chills, diaphoresis and fever.   HENT:  Positive for trouble swallowing. Negative for congestion, rhinorrhea, sinus pressure, sinus pain and sore throat.    Respiratory:  Positive for shortness of breath. Negative for cough, chest tightness and wheezing.    Cardiovascular:  Positive for chest pain (epigastric pain). Negative for palpitations and leg swelling.   Gastrointestinal:  Positive for nausea and vomiting. Negative for abdominal distention, abdominal pain, constipation and diarrhea.   Musculoskeletal:  Negative for arthralgias and myalgias.   Skin:  Negative for rash and wound.   Neurological:  Positive for weakness. Negative for dizziness, syncope, light-headedness and headaches.   All other systems reviewed and are negative.    Objective:     Vital Signs (Most Recent):  Temp: 97.5 °F (36.4 °C) (11/25/23 0604)  Pulse: 105 (11/25/23 0604)  Resp: 16 (11/25/23 1241)  BP: 132/82 (11/25/23 0604)  SpO2: 98 % (11/25/23 0825) Vital Signs (24h Range):  Temp:  [97.5 °F (36.4 °C)-98.9 °F (37.2 °C)] 97.5 °F (36.4 °C)  Pulse:  [100-110] 105  Resp:  [15-22]  16  SpO2:  [96 %-100 %] 98 %  BP: (132-148)/() 132/82     Weight: 77 kg (169 lb 12.1 oz)  Body mass index is 25.81 kg/m².  No intake or output data in the 24 hours ending 11/25/23 1315      Physical Exam  Vitals and nursing note reviewed.   Constitutional:       General: She is not in acute distress.     Appearance: Normal appearance. She is not ill-appearing or diaphoretic.   HENT:      Head: Normocephalic and atraumatic.      Nose: Nose normal.      Mouth/Throat:      Mouth: Mucous membranes are dry.      Pharynx: Oropharynx is clear.   Eyes:      General: No scleral icterus.     Conjunctiva/sclera: Conjunctivae normal.   Cardiovascular:      Rate and Rhythm: Normal rate and regular rhythm.      Pulses: Normal pulses.      Heart sounds: Normal heart sounds. No murmur heard.     No friction rub. No gallop.   Pulmonary:      Effort: Pulmonary effort is normal. No respiratory distress.      Breath sounds: Examination of the left-upper field reveals decreased breath sounds. Examination of the left-middle field reveals decreased breath sounds. Examination of the left-lower field reveals decreased breath sounds. Decreased breath sounds present. No wheezing, rhonchi or rales.   Abdominal:      General: Abdomen is flat. Bowel sounds are normal. There is no distension.      Palpations: Abdomen is soft.      Tenderness: There is no abdominal tenderness. There is no guarding or rebound.   Musculoskeletal:      Right lower leg: No edema.      Left lower leg: No edema.   Skin:     General: Skin is warm and dry.      Capillary Refill: Capillary refill takes less than 2 seconds.      Coloration: Skin is not jaundiced.   Neurological:      Mental Status: She is alert and oriented to person, place, and time.   Psychiatric:         Mood and Affect: Mood normal.         Behavior: Behavior normal.             Significant Labs: All pertinent labs within the past 24 hours have been reviewed.    Significant Imaging: I have  reviewed all pertinent imaging results/findings within the past 24 hours.

## 2023-11-25 NOTE — ASSESSMENT & PLAN NOTE
- CT chest on 11/24 showed    tumor thrombus extending into the SVC. 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   -Dysphagia is secondary to the extrinsic mass compressing the oesophagus.Patient needs to be seen by Oncologist. Oncology consult is scheduled on 11/ 27/23.  -Patient is on Full liquid diet as tolerated.  -Continue with oral Pantoprazole.

## 2023-11-25 NOTE — NURSING
Pt c/o epigastric/chest pain-- EKG ordered and faxed to Dr. Dias. Okayed to give another 1 mg of dilaudid now.

## 2023-11-25 NOTE — HOSPITAL COURSE
Patient was admitted with complain of chest pain, nausea /Vomiting and Decreased intake. Patient has a history of Lung cancer ( squamous cell and adenocarcinoma ) diagnosed on September, 2023. She sees Oncologist at [place]. As per patient and her , treatment has not been started yet because of pending immunology result.   CT chest PE (11/24/23) - Negative for PE. There is a tumor thrombus extending into the SVC, thorasic esophagus likely invaded by tumor/malignancy. Is managed with IV fluids, IV hydromorphine as needed, IV Ondansetron.  Urine analysis positive for E.coli, started on Ciprofloxacin. GI and Pulmonary was consulted.     Overnight, patient had epigastric pain. EKG showed sinus tachycardia. Was managed with 1 mg of Hydromorphine. Patient is on full liquid diet with with boost plus TID. Patient symptom due to compression of Esophagus with  extrinsic mass. Patient needs to follow up with Oncologist for further management. The patient is provided with disc of chest CT findings. Is put on Oral Norco and Ondansetron (11/25).     Patient is feeling better and reports she is able to get liquids down. Patient is scheduled to see her oncologist tomorrow. At this time patient has reached maximum benefit from the hospital and will be d/c home with instructions to keep tomorrows oncology appointment and to f/u with her PCP in 1 week. All medicines have been changed to liquid.

## 2023-11-25 NOTE — PROGRESS NOTES
[facility]  Adult Nutrition  First Assessment Note         Reason for Assessment  Reason For Assessment: identified at risk by screening criteria (MST3)   Nutrition Risk Screen: difficulty chewing/swallowing, unintentional loss of 10 lbs or more in the past 2 months    Assessment and Plan  Patient assessed for MST3. She was admitted 11/23 for dysphagia.     Patient has dx of two types of lung cancer. She states dysphagia has been getting progressively worse over past month.  states she has not been able to eat since September per MD note.She is currently 169 pounds with a BMI of 25.81. Per chart review, she is noted to have an 11.8% significant weight loss since September. Given reported poor po intakes and significant weight loss, patient meets criteria for severe protein-calorie malnutrition per ASPEN guidelines.   RD will perform NFPE and obtain full diet history on follow-up when back on site.     She is currently ordered a full liquid diet with Boost Plus TID. Recommend continue current diet as tolerated. GI has been consulted. Recommend advance diet as medically appropriate. RD available to make additional recommendations as appropriate.     Last BM unknown. Nursing notes patient has not been eating to have  BM.     Medications/labs reviewed. RD following.       Malnutrition  Is Patient Malnourished: Yes Malnutrition Assessment  Malnutrition Context: chronic illness  Malnutrition Level: moderate          Weight Loss (Malnutrition): greater than 7.5% in 3 months  Energy Intake (Malnutrition): less than or equal to 50% for greater than or equal to 1 month                         Skin Integrity  Abbe Risk Assessment  Sensory Perception: 4-->no impairment  Moisture: 4-->rarely moist  Activity: 3-->walks occasionally  Mobility: 3-->slightly limited  Nutrition: 1-->very poor  Friction and Shear: 3-->no apparent problem  Abbe Score: 18      Nutrition Diagnosis  Unintentional weight  loss related to Catabolic illness and Dysphagia/ difficulty swallowing as evidenced by 11.8% weight loss x 2 months, reported poor po intakes since September, increasing dysphagia      Nutrition Risk  Level of Risk/Frequency of Follow-up: high      Recent Labs   Lab 11/24/23  0631   *     Comments on Glucose: Glucose elevated. No PMH DM. Likely r/t inflammatory response to malignancy.     Nutrition Prescription / Recommendations  Recommendation/Intervention: Recommend advance diet as medically appropriate towards Regular diet. Continue Boost Plus TID to improve kcal/protein intakes to better meet nutritional needs. Encourage good po intakes.  Goals: Weight maintenance, intake % of meals and supplements  Nutrition Goal Status: new  Current Diet Order: Full liquid  Oral Nutrition Supplement: Boost Plus  Chewing or Swallowing Difficulty?: Swallowing difficulty  Recommended Diet: Full liquid (blenderized)  Recommended Oral Supplement: Boost Plus [360kcals, 14g Protein, 45g Carbs] 3 times a day  Is Nutrition Support Recommended: [facility] Nutrition Support: No  Is Nutrition Education Recommended: No    Monitor and Evaluation  % current Intake: New Diet order with no P.O. intake documented currently  % intake to meet estimated needs: P.O. + Supplements  Food and Nutrient Intake: food and beverage intake  Food and Nutrient Adminstration: diet order  Anthropometric Measurements: weight, weight change  Biochemical Data, Medical Tests and Procedures: electrolyte and renal panel, gastrointestinal profile, glucose/endocrine profile, inflammatory profile, lipid profile       Current Medical Diagnosis and Past Medical History  Diagnosis: other (see comments)  Past Medical History:   Diagnosis Date    COPD (chronic obstructive pulmonary disease)     Esophageal stricture     Hiatal hernia     Hypertension     Lung cancer        Nutrition/Diet History       Lab/Procedures/Meds  Recent Labs   Lab 11/23/23  9363  "11/24/23  0631   * 133*   K 3.8 3.5   BUN 10 10   CREATININE 0.47* 0.51*   CALCIUM 9.6 9.7   ALBUMIN 2.5*  --    CL 96* 98   ALT 13  --    AST 7*  --    Note: Na+, Cl- low. Recommend consider replete to WNL as appropriate. Cr low. Alb low, likely r/t poor po intakes + hypercatabolic illness    Last A1c: No results found for: "HGBA1C"  Lab Results   Component Value Date    RBC 4.08 (L) 11/24/2023    HGB 10.9 (L) 11/24/2023    HCT 33.9 (L) 11/24/2023    MCV 83.1 11/24/2023    MCH 26.7 (L) 11/24/2023    MCHC 32.2 11/24/2023   Note: H&H low.     Pertinent Labs Reviewed: reviewed  Pertinent Medications Reviewed: reviewed  Scheduled Meds:   ciprofloxacin  400 mg Intravenous Q12H    guaiFENesin 100 mg/5 ml  200 mg Oral Q4H     Continuous Infusions:  PRN Meds:.dextrose 10%, dextrose 10%, glucagon (human recombinant), glucose, glucose, HYDROmorphone, metoclopramide HCl, naloxone, ondansetron, sodium chloride 0.9%      Anthropometrics  Temp: 97.5 °F (36.4 °C)  Height: 5' 8" (172.7 cm)  Height (inches): 68 in  Weight Method: Bed Scale  Weight: 77 kg (169 lb 12.1 oz)  Weight (lb): 169.76 lb  Ideal Body Weight (IBW), Female: 140 lb  % Ideal Body Weight, Female (lb): 121.26 %  BMI (Calculated): 25.8       Estimated/Assessed Needs  RMR (Norris-St. Jeor Equation): 1438.5     Temp: 97.5 °F (36.4 °C)Oral  Weight Used For Calorie Calculations: 76.7 kg (169 lb)     Energy Calorie Requirements (kcal): 1916-2299kcal (25-30kcal/kg()  Weight Used For Protein Calculations: 76.7 kg (169 lb)  Protein Requirements: 92-115g (1.2-1.5g/kg)       RDA Method (mL): 1916       Nutrition by Nursing              Last Bowel Movement: 11/09/23 (PT says she has't been eating to have any bm recently)                Nutrition Follow-Up  RD Follow-up?: Yes      Ann Meade, MS, RD, LD  Available via Secure Chat  "

## 2023-11-26 VITALS
RESPIRATION RATE: 16 BRPM | HEART RATE: 87 BPM | HEIGHT: 68 IN | BODY MASS INDEX: 25.73 KG/M2 | WEIGHT: 169.75 LBS | TEMPERATURE: 98 F | SYSTOLIC BLOOD PRESSURE: 157 MMHG | DIASTOLIC BLOOD PRESSURE: 88 MMHG | OXYGEN SATURATION: 94 %

## 2023-11-26 PROBLEM — E86.0 DEHYDRATION: Status: RESOLVED | Noted: 2023-11-24 | Resolved: 2023-11-26

## 2023-11-26 PROBLEM — N39.0 UTI (URINARY TRACT INFECTION): Status: RESOLVED | Noted: 2023-11-24 | Resolved: 2023-11-26

## 2023-11-26 PROBLEM — E55.9 VITAMIN D DEFICIENCY: Status: ACTIVE | Noted: 2023-11-26

## 2023-11-26 PROBLEM — R79.89 ELEVATED D-DIMER: Status: RESOLVED | Noted: 2023-11-24 | Resolved: 2023-11-26

## 2023-11-26 LAB
ANION GAP SERPL CALCULATED.3IONS-SCNC: 10 MMOL/L (ref 7–16)
BASOPHILS # BLD AUTO: 0.02 K/UL (ref 0–0.2)
BASOPHILS NFR BLD AUTO: 0.2 % (ref 0–1)
BUN SERPL-MCNC: 8 MG/DL (ref 7–18)
BUN/CREAT SERPL: 19 (ref 6–20)
CALCIUM SERPL-MCNC: 9.7 MG/DL (ref 8.5–10.1)
CHLORIDE SERPL-SCNC: 97 MMOL/L (ref 98–107)
CO2 SERPL-SCNC: 29 MMOL/L (ref 21–32)
CREAT SERPL-MCNC: 0.42 MG/DL (ref 0.55–1.02)
DIFFERENTIAL METHOD BLD: ABNORMAL
EGFR (NO RACE VARIABLE) (RUSH/TITUS): 119 ML/MIN/1.73M2
EOSINOPHIL # BLD AUTO: 0.01 K/UL (ref 0–0.5)
EOSINOPHIL NFR BLD AUTO: 0.1 % (ref 1–4)
ERYTHROCYTE [DISTWIDTH] IN BLOOD BY AUTOMATED COUNT: 14.7 % (ref 11.5–14.5)
GLUCOSE SERPL-MCNC: 107 MG/DL (ref 74–106)
HCT VFR BLD AUTO: 33.9 % (ref 38–47)
HGB BLD-MCNC: 11 G/DL (ref 12–16)
IMM GRANULOCYTES # BLD AUTO: 0.06 K/UL (ref 0–0.04)
IMM GRANULOCYTES NFR BLD: 0.6 % (ref 0–0.4)
LYMPHOCYTES # BLD AUTO: 0.84 K/UL (ref 1–4.8)
LYMPHOCYTES NFR BLD AUTO: 7.8 % (ref 27–41)
MCH RBC QN AUTO: 27.1 PG (ref 27–31)
MCHC RBC AUTO-ENTMCNC: 32.4 G/DL (ref 32–36)
MCV RBC AUTO: 83.5 FL (ref 80–96)
MONOCYTES # BLD AUTO: 1.24 K/UL (ref 0–0.8)
MONOCYTES NFR BLD AUTO: 11.5 % (ref 2–6)
MPC BLD CALC-MCNC: 9.9 FL (ref 9.4–12.4)
NEUTROPHILS # BLD AUTO: 8.6 K/UL (ref 1.8–7.7)
NEUTROPHILS NFR BLD AUTO: 79.8 % (ref 53–65)
NRBC # BLD AUTO: 0 X10E3/UL
NRBC, AUTO (.00): 0 %
PLATELET # BLD AUTO: 337 K/UL (ref 150–400)
POTASSIUM SERPL-SCNC: 3.6 MMOL/L (ref 3.5–5.1)
RBC # BLD AUTO: 4.06 M/UL (ref 4.2–5.4)
SODIUM SERPL-SCNC: 132 MMOL/L (ref 136–145)
WBC # BLD AUTO: 10.77 K/UL (ref 4.5–11)

## 2023-11-26 PROCEDURE — 85025 COMPLETE CBC W/AUTO DIFF WBC: CPT

## 2023-11-26 PROCEDURE — 25000003 PHARM REV CODE 250

## 2023-11-26 PROCEDURE — 99239 HOSP IP/OBS DSCHRG MGMT >30: CPT | Mod: GC,,, | Performed by: HOSPITALIST

## 2023-11-26 PROCEDURE — 80048 BASIC METABOLIC PNL TOTAL CA: CPT

## 2023-11-26 PROCEDURE — 63600175 PHARM REV CODE 636 W HCPCS: Performed by: HOSPITALIST

## 2023-11-26 PROCEDURE — 63600175 PHARM REV CODE 636 W HCPCS

## 2023-11-26 PROCEDURE — 99900035 HC TECH TIME PER 15 MIN (STAT)

## 2023-11-26 PROCEDURE — 27000221 HC OXYGEN, UP TO 24 HOURS

## 2023-11-26 PROCEDURE — 94761 N-INVAS EAR/PLS OXIMETRY MLT: CPT

## 2023-11-26 PROCEDURE — 99239 PR HOSPITAL DISCHARGE DAY,>30 MIN: ICD-10-PCS | Mod: GC,,, | Performed by: HOSPITALIST

## 2023-11-26 RX ORDER — METOCLOPRAMIDE HYDROCHLORIDE 5 MG/5ML
10 SOLUTION ORAL 4 TIMES DAILY
Qty: 473 ML | Refills: 0 | Status: SHIPPED | OUTPATIENT
Start: 2023-11-26

## 2023-11-26 RX ORDER — ONDANSETRON HYDROCHLORIDE 4 MG/5ML
8 SOLUTION ORAL EVERY 6 HOURS PRN
Qty: 50 ML | Refills: 1 | Status: SHIPPED | OUTPATIENT
Start: 2023-11-26

## 2023-11-26 RX ORDER — SYRING-NEEDL,DISP,INSUL,0.3 ML 29 G X1/2"
296 SYRINGE, EMPTY DISPOSABLE MISCELLANEOUS ONCE
Status: COMPLETED | OUTPATIENT
Start: 2023-11-26 | End: 2023-11-26

## 2023-11-26 RX ORDER — GUAIFENESIN/DEXTROMETHORPHAN 100-10MG/5
5 SYRUP ORAL EVERY 6 HOURS PRN
Qty: 236 ML | Refills: 0 | Status: SHIPPED | OUTPATIENT
Start: 2023-11-26 | End: 2023-12-06

## 2023-11-26 RX ORDER — HYDROMORPHONE HYDROCHLORIDE 2 MG/ML
1 INJECTION, SOLUTION INTRAMUSCULAR; INTRAVENOUS; SUBCUTANEOUS ONCE
Status: COMPLETED | OUTPATIENT
Start: 2023-11-26 | End: 2023-11-26

## 2023-11-26 RX ORDER — HYDROCODONE BITARTRATE AND ACETAMINOPHEN 7.5; 325 MG/15ML; MG/15ML
30 SOLUTION ORAL EVERY 6 HOURS PRN
Qty: 473 ML | Refills: 0 | Status: SHIPPED | OUTPATIENT
Start: 2023-11-26

## 2023-11-26 RX ORDER — ALBUTEROL SULFATE 90 UG/1
2 AEROSOL, METERED RESPIRATORY (INHALATION) EVERY 4 HOURS PRN
Qty: 18 G | Refills: 6 | Status: SHIPPED | OUTPATIENT
Start: 2023-11-26

## 2023-11-26 RX ADMIN — CIPROFLOXACIN 400 MG: 2 INJECTION, SOLUTION INTRAVENOUS at 05:11

## 2023-11-26 RX ADMIN — MAGNESIUM CITRATE 296 ML: 1.75 LIQUID ORAL at 01:11

## 2023-11-26 RX ADMIN — GUAIFENESIN 200 MG: 200 SOLUTION ORAL at 05:11

## 2023-11-26 RX ADMIN — GUAIFENESIN 200 MG: 200 SOLUTION ORAL at 08:11

## 2023-11-26 RX ADMIN — GUAIFENESIN 200 MG: 200 SOLUTION ORAL at 01:11

## 2023-11-26 RX ADMIN — METHYLPREDNISOLONE SODIUM SUCCINATE 40 MG: 40 INJECTION, POWDER, LYOPHILIZED, FOR SOLUTION INTRAMUSCULAR; INTRAVENOUS at 08:11

## 2023-11-26 RX ADMIN — HYDROCODONE BITARTRATE AND ACETAMINOPHEN 15 ML: 7.5; 325 SOLUTION ORAL at 05:11

## 2023-11-26 RX ADMIN — HYDROMORPHONE HYDROCHLORIDE 1 MG: 2 INJECTION INTRAMUSCULAR; INTRAVENOUS; SUBCUTANEOUS at 11:11

## 2023-11-26 NOTE — ASSESSMENT & PLAN NOTE
- cultures show pansensitive E. Coli  - stopped Ciprofloxacin and sent home with nitrofurantoin

## 2023-11-26 NOTE — DISCHARGE SUMMARY
"[facility]  [facility] Medicine  Discharge Summary      Patient Name: Viviane Seaman  MRN: [id number]  NIHARIKA: [id number]  Patient Class: IP- Inpatient  Admission Date: 11/23/2023  Hospital Length of Stay: 2 days  Discharge Date and Time:  11/26/2023 1:03 PM  Attending Physician: Nitish Acuña MD   Discharging Provider: Stewart Plaza DO  Primary Care Provider: Justin Bolton MD    Primary Care Team: Networked reference to record PCT     HPI:   Pt is a 47 yo female who presented to [facility] with a cc of "chest pain, nausea, vomiting, and decreased intake." Pt states that she was diagnosed with 2 types of lung cancer (oncologist in [place]) and over the past 2 months has had these symptoms. She states that last night the pain and nausea became worse and she decided to come to the ED. She states that the chest pain is worse with deep breaths but does have some radiation to the left side of her chest. She states that she thinks she needs her esophagus stretched. Pt also endorses "foamy vomit" over the past 2 days.  who is present in room states that patient has not been able to eat since September. Of note, pt recently diagnosed with UTI and has been unable to tolerate PO abx.     Significant Findings in the ED include:  - Vitals: Temp 97.9, /96, HR 94, RR 20, SpO2 100%  - Labs: WBC 11.41, H&H 11.7/36.3, MCV 82.9, Plt 330. Na 133, K 3.8, Cl 96, BUN 10, Cr 0.47, eGFR 116. proBNP 189, Trop I <4 and 4.2 respectively. D-Dimer 2.58  - Imaging: CXR and CTPE currently pending over-read, however diffuse opacities noted to left lung  - Interventions: 500 cc NaCl    Pt was subsequently admitted to inpatient hospitalist services under the direct supervision of Dr. Dias for further evaluation and management of dysphagia.     * No surgery found *      Hospital Course:   Patient was admitted with complain of chest pain, nausea /Vomiting and Decreased intake. Patient has a history of Lung " cancer ( squamous cell and adenocarcinoma ) diagnosed on September, 2023. She sees Oncologist at [place]. As per patient and her , treatment has not been started yet because of pending immunology result.   CT chest PE (11/24/23) - Negative for PE. There is a tumor thrombus extending into the SVC, thorasic esophagus likely invaded by tumor/malignancy. Is managed with IV fluids, IV hydromorphine as needed, IV Ondansetron.  Urine analysis positive for E.coli, started on Ciprofloxacin. GI and Pulmonary was consulted.     Overnight, patient had epigastric pain. EKG showed sinus tachycardia. Was managed with 1 mg of Hydromorphine. Patient is on full liquid diet with with boost plus TID. Patient symptom due to compression of Esophagus with  extrinsic mass. Patient needs to follow up with Oncologist for further management. The patient is provided with disc of chest CT findings. Is put on Oral Norco and Ondansetron (11/25).     Patient is feeling better and reports she is able to get liquids down. Patient is scheduled to see her oncologist tomorrow. At this time patient has reached maximum benefit from the hospital and will be d/c home with instructions to keep tomorrows oncology appointment and to f/u with her PCP in 1 week. All medicines have been changed to liquid.      Goals of Care Treatment Preferences:  Code Status: Full Code      Consults:     Renal/  UTI (urinary tract infection)-resolved as of 11/26/2023  - cultures show pansensitive E. Coli  - stopped Ciprofloxacin and sent home with nitrofurantoin       Oncology  Malignant neoplasm of left lung  Squamous cell and adenocarcinoma of Left Lung  - Oncologist in [place]  - CT chest on 11/24 showed    tumor thrombus extending into the SVC. 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   -Dysphagia is secondary to the extrinsic mass compressing the oesophagus.Patient needs to be seen by Oncologist. Oncology consult is scheduled on 11/ 27/23.  -  Patient and her  provided with disc of CT Chest findings ( to be taken for Oncology follow up on 11/27/23)  -Will continue with symptomatic management.  - D/C and will f/u with oncology tomorrow     GI  * Dysphagia  - CT chest on 11/24 showed    tumor thrombus extending into the SVC. 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   -Dysphagia is secondary to the extrinsic mass compressing the oesophagus.Patient needs to be seen by Oncologist. Oncology consult is scheduled on 11/ 27/23.  -Patient is on Full liquid diet as tolerated.  -Continue with oral Pantoprazole.  -all meds changed to liquids  -Patient d/c home in order to make oncology appointment tomorrow.       Final Active Diagnoses:    Diagnosis Date Noted POA    PRINCIPAL PROBLEM:  Dysphagia [R13.10] 05/30/2023 Yes    Malignant neoplasm of left lung [C34.92] 11/24/2023 Yes      Problems Resolved During this Admission:    Diagnosis Date Noted Date Resolved POA    UTI (urinary tract infection) [N39.0] 11/24/2023 11/26/2023 Yes    Dehydration [E86.0] 11/24/2023 11/26/2023 Yes    Elevated d-dimer [R79.89] 11/24/2023 11/26/2023 Yes       Discharged Condition: stable    Disposition: Home or Self Care    Follow Up:   Follow-up Information       Justin Bolton MD Follow up in 1 week(s).    Specialty: Family Medicine  Contact information:  [address] [place], [place] [id number] [phone]                           Patient Instructions:      Diet Adult Regular       Significant Diagnostic Studies: Radiology: CT scan:  CT Chest    Pending Diagnostic Studies:       Procedure Component Value Units Date/Time    EXTRA TUBES [[id number]] Collected: 11/23/23 2238    Order Status: Sent Lab Status: In process Updated: 11/23/23 2242    Specimen: Blood, Venous     Narrative:      The following orders were created for panel order EXTRA TUBES.  Procedure                               Abnormality         Status                     ---------                                -----------         ------                     Gold Top Hold[[id number]]                                   In process                   Please view results for these tests on the individual orders.           Medications:  Reconciled Home Medications:      Medication List        START taking these medications      dextromethorphan-guaiFENesin  mg/5 ml  mg/5 mL liquid  Commonly known as: ROBITUSSIN-DM  Take 5 mLs by mouth every 6 (six) hours as needed.     hydrocodone-apap 7.5-325 MG/15 ML oral solution  Commonly known as: HYCET  Take 30 mLs by mouth every 6 (six) hours as needed for Pain.     metoclopramide HCl 5 mg/5 mL Soln  Commonly known as: REGLAN  Take 10 mLs (10 mg total) by mouth 4 (four) times daily.     nitrofurantoin (macrocrystal-monohydrate) 100 MG capsule  Commonly known as: MACROBID  Take 1 capsule (100 mg total) by mouth 2 (two) times daily. for 5 days     ondansetron 4 mg/5 mL solution  Commonly known as: ZOFRAN  Take 10 mLs (8 mg total) by mouth every 6 (six) hours as needed.            CONTINUE taking these medications      albuterol 90 mcg/actuation inhaler  Commonly known as: PROVENTIL/VENTOLIN HFA  Inhale 2 puffs into the lungs every 4 (four) hours as needed.     gabapentin 100 MG capsule  Commonly known as: NEURONTIN  Take 3 capsules (300 mg total) by mouth 3 (three) times daily.     ibuprofen 800 MG tablet  Commonly known as: ADVIL,MOTRIN  Take 800 mg by mouth 2 (two) times daily as needed.     omeprazole 20 MG capsule  Commonly known as: PRILOSEC  Take 20 mg by mouth once daily.            STOP taking these medications      ondansetron 4 MG Tbdl  Commonly known as: ZOFRAN-ODT              Indwelling Lines/Drains at time of discharge:   Lines/Drains/Airways       None                   Time spent on the discharge of patient: 30 minutes         Stewart Plaza DO  Department of Hospital Medicine  [facility]

## 2023-11-26 NOTE — ASSESSMENT & PLAN NOTE
Squamous cell and adenocarcinoma of Left Lung  - Oncologist in [place]  - CT chest on 11/24 showed    tumor thrombus extending into the SVC. 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   -Dysphagia is secondary to the extrinsic mass compressing the oesophagus.Patient needs to be seen by Oncologist. Oncology consult is scheduled on 11/ 27/23.  - Patient and her  provided with disc of CT Chest findings ( to be taken for Oncology follow up on 11/27/23)  -Will continue with symptomatic management.  - D/C and will f/u with oncology tomorrow

## 2023-11-26 NOTE — ASSESSMENT & PLAN NOTE
- CT chest on 11/24 showed    tumor thrombus extending into the SVC. 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   -Dysphagia is secondary to the extrinsic mass compressing the oesophagus.Patient needs to be seen by Oncologist. Oncology consult is scheduled on 11/ 27/23.  -Patient is on Full liquid diet as tolerated.  -Continue with oral Pantoprazole.  -all meds changed to liquids  -Patient d/c home in order to make oncology appointment tomorrow.

## 2023-11-28 ENCOUNTER — HOSPITAL ENCOUNTER (EMERGENCY)
Facility: HOSPITAL | Age: 48
Discharge: HOME | End: 2023-11-28
Attending: EMERGENCY MEDICINE
Payer: MEDICAID

## 2023-11-28 VITALS
RESPIRATION RATE: 18 BRPM | BODY MASS INDEX: 24.25 KG/M2 | OXYGEN SATURATION: 100 % | WEIGHT: 160 LBS | HEART RATE: 98 BPM | SYSTOLIC BLOOD PRESSURE: 118 MMHG | DIASTOLIC BLOOD PRESSURE: 92 MMHG | TEMPERATURE: 98 F | HEIGHT: 68 IN

## 2023-11-28 DIAGNOSIS — C34.92 PRIMARY SQUAMOUS CELL CARCINOMA OF LEFT LUNG: ICD-10-CM

## 2023-11-28 DIAGNOSIS — C34.92 PRIMARY ADENOCARCINOMA OF LEFT LUNG: Primary | ICD-10-CM

## 2023-11-28 DIAGNOSIS — R11.2 INTRACTABLE NAUSEA AND VOMITING: ICD-10-CM

## 2023-11-28 DIAGNOSIS — R05.9 COUGH: ICD-10-CM

## 2023-11-28 DIAGNOSIS — J18.9 PNEUMONIA OF LEFT LOWER LOBE DUE TO INFECTIOUS ORGANISM: ICD-10-CM

## 2023-11-28 DIAGNOSIS — R13.19 ESOPHAGEAL DYSPHAGIA: ICD-10-CM

## 2023-11-28 LAB
ALBUMIN SERPL BCP-MCNC: 2.9 G/DL (ref 3.5–5)
ALBUMIN/GLOB SERPL: 0.5 {RATIO}
ALP SERPL-CCNC: 136 U/L (ref 41–108)
ALT SERPL W P-5'-P-CCNC: 11 U/L (ref 13–56)
AMORPH PHOS CRY #/AREA URNS LPF: ABNORMAL /LPF
AMPHET UR QL SCN: NEGATIVE
AMYLASE SERPL-CCNC: 22 U/L (ref 25–115)
ANION GAP SERPL CALCULATED.3IONS-SCNC: 13 MMOL/L (ref 7–16)
AST SERPL W P-5'-P-CCNC: 16 U/L (ref 15–37)
BACTERIA #/AREA URNS HPF: ABNORMAL /HPF
BARBITURATES UR QL SCN: NEGATIVE
BASOPHILS # BLD AUTO: 0.05 K/UL (ref 0–0.2)
BASOPHILS NFR BLD AUTO: 0.3 % (ref 0–1)
BENZODIAZ METAB UR QL SCN: NEGATIVE
BILIRUB SERPL-MCNC: 0.4 MG/DL (ref ?–1.2)
BILIRUB UR QL STRIP: NEGATIVE
BUN SERPL-MCNC: 8 MG/DL (ref 7–18)
BUN/CREAT SERPL: 13 (ref 6–20)
CALCIUM SERPL-MCNC: 9.6 MG/DL (ref 8.5–10.1)
CANNABINOIDS UR QL SCN: POSITIVE
CHLORIDE SERPL-SCNC: 94 MMOL/L (ref 98–107)
CLARITY UR: CLEAR
CO2 SERPL-SCNC: 30 MMOL/L (ref 21–32)
COCAINE UR QL SCN: NEGATIVE
COLOR UR: YELLOW
CREAT SERPL-MCNC: 0.61 MG/DL (ref 0.55–1.02)
DIFFERENTIAL METHOD BLD: ABNORMAL
EGFR (NO RACE VARIABLE) (RUSH/TITUS): 109 ML/MIN/1.73M2
EOSINOPHIL # BLD AUTO: 0.08 K/UL (ref 0–0.5)
EOSINOPHIL NFR BLD AUTO: 0.5 % (ref 1–4)
ERYTHROCYTE [DISTWIDTH] IN BLOOD BY AUTOMATED COUNT: 15.1 % (ref 11.5–14.5)
GLOBULIN SER-MCNC: 5.5 G/DL (ref 2–4)
GLUCOSE SERPL-MCNC: 120 MG/DL (ref 74–106)
GLUCOSE UR STRIP-MCNC: NEGATIVE MG/DL
HCT VFR BLD AUTO: 41.6 % (ref 38–47)
HGB BLD-MCNC: 13.1 G/DL (ref 12–16)
IMM GRANULOCYTES # BLD AUTO: 0.11 K/UL (ref 0–0.04)
IMM GRANULOCYTES NFR BLD: 0.6 % (ref 0–0.4)
KETONES UR STRIP-SCNC: NEGATIVE MG/DL
LACTATE SERPL-SCNC: 3.3 MMOL/L (ref 0.4–2)
LEUKOCYTE ESTERASE UR QL STRIP: NEGATIVE
LIPASE SERPL-CCNC: <10 U/L (ref 73–393)
LYMPHOCYTES # BLD AUTO: 0.55 K/UL (ref 1–4.8)
LYMPHOCYTES NFR BLD AUTO: 3.1 % (ref 27–41)
MAGNESIUM SERPL-MCNC: 2.1 MG/DL (ref 1.7–2.3)
MCH RBC QN AUTO: 26.8 PG (ref 27–31)
MCHC RBC AUTO-ENTMCNC: 31.5 G/DL (ref 32–36)
MCV RBC AUTO: 85.2 FL (ref 80–96)
MONOCYTES # BLD AUTO: 1.01 K/UL (ref 0–0.8)
MONOCYTES NFR BLD AUTO: 5.8 % (ref 2–6)
MPC BLD CALC-MCNC: 9.5 FL (ref 9.4–12.4)
MUCOUS THREADS #/AREA URNS HPF: ABNORMAL /HPF
NEUTROPHILS # BLD AUTO: 15.68 K/UL (ref 1.8–7.7)
NEUTROPHILS NFR BLD AUTO: 89.7 % (ref 53–65)
NITRITE UR QL STRIP: NEGATIVE
NRBC # BLD AUTO: 0 X10E3/UL
NRBC, AUTO (.00): 0 %
OPIATES UR QL SCN: POSITIVE
PCP UR QL SCN: NEGATIVE
PH UR STRIP: 8.5 PH UNITS
PLATELET # BLD AUTO: 355 K/UL (ref 150–400)
POTASSIUM SERPL-SCNC: 3.9 MMOL/L (ref 3.5–5.1)
PROT SERPL-MCNC: 8.4 G/DL (ref 6.4–8.2)
PROT UR QL STRIP: 100
RBC # BLD AUTO: 4.88 M/UL (ref 4.2–5.4)
RBC # UR STRIP: NEGATIVE /UL
RBC #/AREA URNS HPF: ABNORMAL /HPF
SODIUM SERPL-SCNC: 133 MMOL/L (ref 136–145)
SP GR UR STRIP: 1.02
SQUAMOUS #/AREA URNS LPF: ABNORMAL /LPF
UROBILINOGEN UR STRIP-ACNC: 1 MG/DL
WBC # BLD AUTO: 17.48 K/UL (ref 4.5–11)
WBC #/AREA URNS HPF: ABNORMAL /HPF

## 2023-11-28 PROCEDURE — 83690 ASSAY OF LIPASE: CPT | Performed by: EMERGENCY MEDICINE

## 2023-11-28 PROCEDURE — 25000003 PHARM REV CODE 250: Performed by: EMERGENCY MEDICINE

## 2023-11-28 PROCEDURE — 80307 DRUG TEST PRSMV CHEM ANLYZR: CPT | Performed by: EMERGENCY MEDICINE

## 2023-11-28 PROCEDURE — 81001 URINALYSIS AUTO W/SCOPE: CPT | Mod: XB | Performed by: EMERGENCY MEDICINE

## 2023-11-28 PROCEDURE — 83735 ASSAY OF MAGNESIUM: CPT | Performed by: EMERGENCY MEDICINE

## 2023-11-28 PROCEDURE — 99284 EMERGENCY DEPT VISIT MOD MDM: CPT | Mod: 25

## 2023-11-28 PROCEDURE — 96361 HYDRATE IV INFUSION ADD-ON: CPT

## 2023-11-28 PROCEDURE — 82150 ASSAY OF AMYLASE: CPT | Performed by: EMERGENCY MEDICINE

## 2023-11-28 PROCEDURE — 85025 COMPLETE CBC W/AUTO DIFF WBC: CPT | Performed by: EMERGENCY MEDICINE

## 2023-11-28 PROCEDURE — 99285 EMERGENCY DEPT VISIT HI MDM: CPT | Mod: ,,, | Performed by: EMERGENCY MEDICINE

## 2023-11-28 PROCEDURE — 63600175 PHARM REV CODE 636 W HCPCS: Performed by: EMERGENCY MEDICINE

## 2023-11-28 PROCEDURE — 83605 ASSAY OF LACTIC ACID: CPT | Performed by: EMERGENCY MEDICINE

## 2023-11-28 PROCEDURE — 96375 TX/PRO/DX INJ NEW DRUG ADDON: CPT

## 2023-11-28 PROCEDURE — 80053 COMPREHEN METABOLIC PANEL: CPT | Performed by: EMERGENCY MEDICINE

## 2023-11-28 PROCEDURE — 96365 THER/PROPH/DIAG IV INF INIT: CPT

## 2023-11-28 PROCEDURE — 99285 PR EMERGENCY DEPT VISIT,LEVEL V: ICD-10-PCS | Mod: ,,, | Performed by: EMERGENCY MEDICINE

## 2023-11-28 RX ORDER — PROCHLORPERAZINE EDISYLATE 5 MG/ML
10 INJECTION INTRAMUSCULAR; INTRAVENOUS
Status: COMPLETED | OUTPATIENT
Start: 2023-11-28 | End: 2023-11-28

## 2023-11-28 RX ORDER — MORPHINE SULFATE 4 MG/ML
4 INJECTION, SOLUTION INTRAMUSCULAR; INTRAVENOUS
Status: COMPLETED | OUTPATIENT
Start: 2023-11-28 | End: 2023-11-28

## 2023-11-28 RX ORDER — SODIUM CHLORIDE 9 MG/ML
1000 INJECTION, SOLUTION INTRAVENOUS
Status: COMPLETED | OUTPATIENT
Start: 2023-11-28 | End: 2023-11-28

## 2023-11-28 RX ORDER — ONDANSETRON 2 MG/ML
4 INJECTION INTRAMUSCULAR; INTRAVENOUS
Status: COMPLETED | OUTPATIENT
Start: 2023-11-28 | End: 2023-11-28

## 2023-11-28 RX ORDER — LEVOFLOXACIN 5 MG/ML
750 INJECTION, SOLUTION INTRAVENOUS
Status: COMPLETED | OUTPATIENT
Start: 2023-11-28 | End: 2023-11-28

## 2023-11-28 RX ADMIN — LEVOFLOXACIN 750 MG: 750 INJECTION, SOLUTION INTRAVENOUS at 03:11

## 2023-11-28 RX ADMIN — SODIUM CHLORIDE 1000 ML: 9 INJECTION, SOLUTION INTRAVENOUS at 03:11

## 2023-11-28 RX ADMIN — MORPHINE SULFATE 4 MG: 4 INJECTION, SOLUTION INTRAMUSCULAR; INTRAVENOUS at 02:11

## 2023-11-28 RX ADMIN — SODIUM CHLORIDE 1000 ML: 9 INJECTION, SOLUTION INTRAVENOUS at 02:11

## 2023-11-28 RX ADMIN — PROCHLORPERAZINE EDISYLATE 10 MG: 5 INJECTION INTRAMUSCULAR; INTRAVENOUS at 02:11

## 2023-11-28 RX ADMIN — ONDANSETRON 4 MG: 2 INJECTION INTRAMUSCULAR; INTRAVENOUS at 02:11

## 2023-11-28 NOTE — ED PROVIDER NOTES
Encounter Date: 2023       History     Chief Complaint   Patient presents with    Vomiting     Patient presents with report of intractable nausea and vomiting, cough, spitting up or coughing up bloody mucus.  Symptoms have been present for some time now but are getting worse and worse.  Patient was seen in the emergency department on  and treated and was to follow up with Oncology.  She presented to the emergency department in [place] and was seen and admitted for 3 days and treated there and discharged home on 2023.  Patient had an appointment to see Oncology yesterday but states that she was too sick to go.  She rescheduled the appointment for 6 days from now.  She is here today because she is weak, having pain all over but especially in her chest and epigastric area, she is spitting up or vomiting up blood-tinged mucus.  States she is been unable to swallow any whole foods since 2023.  Patient has known squamous cell carcinoma and adenocarcinoma of the left lung with a mass that appears to be invading the esophagus in the right mediastinum and hilar area.      Review of patient's allergies indicates:   Allergen Reactions    Cephalexin Swelling     Past Medical History:   Diagnosis Date    COPD (chronic obstructive pulmonary disease)     Esophageal stricture     Hiatal hernia     Hypertension     Lung cancer      Past Surgical History:   Procedure Laterality Date     SECTION      CHOLECYSTECTOMY      DILATION AND CURETTAGE OF UTERUS      EYE SURGERY Left     HIP SURGERY Left     PLATE    JOINT REPLACEMENT Left     HIP    LEG SURGERY Left     TONSILLECTOMY       Family History   Problem Relation Age of Onset    No Known Problems Mother     No Known Problems Father      Social History     Tobacco Use    Smoking status: Former     Current packs/day: 0.00     Types: Cigarettes     Quit date: 2023     Years since quittin.2    Smokeless tobacco: Never   Substance Use  "Topics    Alcohol use: Never    Drug use: Never     Review of Systems   Constitutional:  Positive for activity change (decreased activity due to weakness and not feeling well.) and appetite change (Poor appetite for quite some time). Negative for fever.   HENT:  Positive for trouble swallowing.    Eyes: Negative.    Respiratory:  Positive for cough, choking (patient reports choking on blood-tinged mucus.) and shortness of breath. Negative for wheezing and stridor.    Cardiovascular: Negative.  Negative for leg swelling.        Has pain with cough or attempted swallowing but otherwise no chest pain relatable to cardiac cause.   Gastrointestinal:  Positive for abdominal pain (reports epigastric abdominal pain which she attributes to retching), nausea and vomiting. Negative for blood in stool, constipation and diarrhea.   Genitourinary:  Positive for decreased urine volume.   Musculoskeletal:  Negative for back pain, gait problem, neck pain and neck stiffness.        Patient reports "pain all over".   Skin:  Negative for color change, pallor, rash and wound.   Neurological:  Positive for weakness (patient has generalized weakness no focal deficits.) and light-headedness. Negative for dizziness, tremors, seizures, syncope, facial asymmetry, speech difficulty, numbness and headaches.   Psychiatric/Behavioral: Negative.     All other systems reviewed and are negative.      Physical Exam     Initial Vitals [11/28/23 1345]   BP Pulse Resp Temp SpO2   106/68 (!) 111 (!) 24 97.7 °F (36.5 °C) (!) 93 %      MAP       --         Physical Exam    Nursing note and vitals reviewed.  Constitutional: She appears cachectic.  Non-toxic appearance. She has a sickly appearance. She appears ill.     Patient appears chronically ill and appears to be in discomfort.   HENT:   Right Ear: External ear normal.   Left Ear: External ear normal.   Nose: Nose normal.   Mouth/Throat: Mucous membranes are dry.   Eyes: Conjunctivae and EOM are normal. " Pupils are equal, round, and reactive to light. Right eye exhibits no discharge. Left eye exhibits no discharge. No scleral icterus.   Neck: Neck supple. No tracheal deviation present. No JVD present.   Normal range of motion.  Cardiovascular:  Regular rhythm, normal heart sounds and intact distal pulses.   Tachycardia present.         No murmur heard.  Pulmonary/Chest: No stridor. No respiratory distress. She has no wheezes. She has rhonchi. She has rales. She exhibits no tenderness.   Patient is coughing up blood-tinged clear thick mucus.   Abdominal: Abdomen is soft. Bowel sounds are normal. She exhibits no distension. There is no abdominal tenderness.   Musculoskeletal:         General: Edema (trace bilateral lower extremity edema, no calf tenderness.) present. No tenderness. Normal range of motion.      Cervical back: Normal range of motion and neck supple.     Lymphadenopathy:     She has no cervical adenopathy.   Neurological: She is alert and oriented to person, place, and time. GCS score is 15. GCS eye subscore is 4. GCS verbal subscore is 5. GCS motor subscore is 6.   Patient has generalized weakness but no focal deficits.   Skin: Skin is warm and dry. Capillary refill takes more than 3 seconds.   Patient has chronic brawny scaling discoloration of both lower extremities with trace edema.   Psychiatric: Her speech is normal. Thought content normal. Her affect is labile. She exhibits a depressed mood. She expresses no homicidal and no suicidal ideation.   Patient is tearful.         Medical Screening Exam   See Full Note    ED Course   Procedures  Labs Reviewed   AMYLASE - Abnormal; Notable for the following components:       Result Value    Amylase 22 (*)     All other components within normal limits   COMPREHENSIVE METABOLIC PANEL - Abnormal; Notable for the following components:    Sodium 133 (*)     Chloride 94 (*)     Glucose 120 (*)     Total Protein 8.4 (*)     Albumin 2.9 (*)     Globulin 5.5 (*)      Alk Phos 136 (*)     ALT 11 (*)     All other components within normal limits   LACTIC ACID, PLASMA - Abnormal; Notable for the following components:    Lactic Acid 3.3 (*)     All other components within normal limits   LIPASE - Abnormal; Notable for the following components:    Lipase <10 (*)     All other components within normal limits   URINALYSIS, REFLEX TO URINE CULTURE - Abnormal; Notable for the following components:    pH, UA 8.5 (*)     Protein,  (*)     All other components within normal limits   CBC WITH DIFFERENTIAL - Abnormal; Notable for the following components:    WBC 17.48 (*)     MCH 26.8 (*)     MCHC 31.5 (*)     RDW 15.1 (*)     Neutrophils % 89.7 (*)     Lymphocytes % 3.1 (*)     Eosinophils % 0.5 (*)     Immature Granulocytes % 0.6 (*)     Neutrophils, Abs 15.68 (*)     Lymphocytes, Absolute 0.55 (*)     Monocytes, Absolute 1.01 (*)     Immature Granulocytes, Absolute 0.11 (*)     All other components within normal limits   DRUG SCREEN, URINE (BEAKER) - Abnormal; Notable for the following components:    Opiates, Urine Positive (*)     Cannabinoid, Urine Positive (*)     All other components within normal limits   URINALYSIS, MICROSCOPIC - Abnormal; Notable for the following components:    Bacteria, UA Few (*)     Squamous Epithelial Cells, UA Few (*)     Mucus, UA Moderate (*)     Amorphous Crystals, UA Moderate (*)     All other components within normal limits   MAGNESIUM - Normal   CBC W/ AUTO DIFFERENTIAL    Narrative:     The following orders were created for panel order CBC auto differential.  Procedure                               Abnormality         Status                     ---------                               -----------         ------                     CBC with Differential[[id number]]       Abnormal            Final result                 Please view results for these tests on the individual orders.          Imaging Results              X-Ray Chest PA And Lateral  (Final result)  Result time 11/28/23 14:24:26      Final result by Ricardo Robins II, MD (11/28/23 14:24:26)                   Impression:      Increased left lung density when compared to previous could represent worsening pneumonia.      Electronically signed by: Ricardo Robins  Date:    11/28/2023  Time:    14:24               Narrative:    EXAMINATION:  XR CHEST PA AND LATERAL    CLINICAL HISTORY:  Cough, unspecified    COMPARISON:  23 November 2023    TECHNIQUE:  XR CHEST PA AND LATERAL    FINDINGS:  The heart and mediastinum are normal in size and configuration.  The pulmonary vascularity is normal in caliber.  Left lung infiltrate appears increasing.  There is suggestion of possible cavitary area on the left midlung.  No other lung infiltrates, effusions, pneumothorax or other abnormality is demonstrated.                                       Medications   levoFLOXacin 750 mg/150 mL IVPB 750 mg (750 mg Intravenous New Bag 11/28/23 1503)   sodium chloride 0.9% bolus 1,000 mL 1,000 mL (0 mLs Intravenous Stopped 11/28/23 1527)   prochlorperazine injection Soln 10 mg (10 mg Intravenous Given 11/28/23 1423)   morphine injection 4 mg (4 mg Intravenous Given 11/28/23 1459)   ondansetron injection 4 mg (4 mg Intravenous Given 11/28/23 1459)   0.9%  NaCl infusion (1,000 mLs Intravenous New Bag 11/28/23 1538)     Medical Decision Making  Differential diagnosis includes metastatic lung carcinoma, biopsy-proven 2 types adenocarcinoma and squamous cell carcinoma, with invasion of the esophagus causing compression and dysphagia.  Patient now has hemoptysis.  Could have obstructive pneumonia as well.  Patient could be septic.  Could have urinary infection as well.  Has had poor oral intake on a chronic basis.  Patient has a history of noncompliance.  She has not begun any type of treatment for known lung malignancy and at this point maybe at a palliative or hospice point in care, defer to Oncology for evaluation and  current recommendations for treatment.  Recommend transfer to higher level of care.  Patient declines to have arterial blood gas drawn.  Does consent to transfer to higher level of care if we are able to arrange it.    Patient for the 2nd time has decided to sign out against medical advice, discussed the importance of allowing us to transfer and get her definitive evaluation and treatment for her malignancy, unable to convince patient to stay and await transfer, she is signing out against medical advice.    Amount and/or Complexity of Data Reviewed  Labs: ordered. Decision-making details documented in ED Course.  Radiology: ordered. Decision-making details documented in ED Course.  Discussion of management or test interpretation with external provider(s): Patient was discussed with the Oncology Center in [place] where patient has been seen, they recommend transfer for admission to [facility] where she can be seen by her oncologist, Dr. Neff, and proceed from there.    Risk  Prescription drug management.               ED Course as of 11/28/23 1625   Tue Nov 28, 2023   1440 X-Ray Chest PA And Lateral  Review of radiologist's report for chest x-ray indicates possible cavitary lesion on the left, increased density in the left lower lung which could represent developing pneumonia.  (patient has a known lung malignancy.) [LM]   1441 CBC auto differential(!)  CBC shows increased white count 17.48 with 89% neutrophils, normal hemoglobin and hematocrit, normal platelet count. [LM]   1457 Comprehensive metabolic panel(!)  CBC shows slightly low sodium of 133, glucose slightly elevated at 120, albumin is low at 2.9 total protein is increased at 8.4, globulin is increased to 5.5, alkaline phosphatase increased at 136. [LM]   1458 Urinalysis, Reflex to Urine Culture Urine, Clean Catch(!)  Urinalysis shows [LM]   1458 Lactic acid, plasma(!)  Proteinuria and pH increased at 8.5.  Lactic acid level is elevated at 3.3. [LM]   1458  Amylase(!)  Amylase is not elevated [LM]   1458 Magnesium  Magnesium is normal at 2.1. [LM]   1623 Drug Screen, Urine(!)  Urine is positive for opiates and cannabinoids. [LM]   1623 Urinalysis, Microscopic(!)  Micro urinalysis shows 0-5 WBCs, few bacteria moderate mucus. [LM]      ED Course User Index  [LM] Malcom Arana,                              Clinical Impression:   Final diagnoses:  [R05.9] Cough  [C34.92] Primary adenocarcinoma of left lung (Primary)  [C34.92] Primary squamous cell carcinoma of left lung  [R11.2] Intractable nausea and vomiting  [R13.19] Esophageal dysphagia  [J18.9] Pneumonia of left lower lobe due to infectious organism        ED Disposition Condition    AMA Stable                Malcom Arana, DO  11/28/23 1500       Malcom Arana, DO  11/28/23 1622       Malcom Arana, DO  11/28/23 1625

## 2023-11-28 NOTE — DISCHARGE INSTRUCTIONS
Please seek care at the nearest emergency department or with your primary care as soon as possible

## 2023-11-28 NOTE — PLAN OF CARE
[facility]  Discharge Final Note    Primary Care Provider: Justin Bolton MD    Expected Discharge Date: 11/26/2023    Final Discharge Note (most recent)       Final Note - 11/28/23 0803          Final Note    Assessment Type Final Discharge Note     Anticipated Discharge Disposition Home or Self Care                     Important Message from Medicare             Contact Info       Justin Bolton MD   Specialty: Family Medicine   Relationship: PCP - General    [address]  [address]  [place] [id number]   Phone: [phone]       Next Steps: Follow up in 1 week(s)          Pt discharged home with no needs.

## 2023-11-28 NOTE — ED NOTES
ATTEMPTED TO ENCOURAGE PATIENT TO ALLOW TRANSFER WITH NO SUCCESS; PATIENT STATED SHE WANTED TO FIND ANOTHER DOCTOR; INFORMED PATIENT THAT THERE WERE MANY CANCER DOCTORS @ [facility] & SHE COULD HAVE REQUESTED A CHANGE THERE; RECEIVED NO RESPONSE

## 2023-11-28 NOTE — ED TRIAGE NOTES
Pt to   Er with c/o generalized pain and vomiting onset yesterday. Pt was discharged from [facility] in [place] Sunday. Pt is being treated for lung cancer at this time. Pain is a 10 on 0-10 scale.

## 2023-11-28 NOTE — ED NOTES
SPOKE WITH SAMMY @ [facility] ONCOLOGY DEPARTMENT R/T PATIENT @ [phone]; WAS INFORMED PATIENT HAS REFUSED PORT TO BE ABLE TO START TREATMENT; HAS HAD UPPER SCOPE WITH DX OF HIATAL HERNIA; REPORT OF AMPHETAMINE ABUSE; NON COMPLIANT WITH CARE; & NON COMPLIANT WITH APPOINTMENTS; SUGGESTED POSSIBLE TRANSFER TO [facility] R/T DR. GAUDENCIO JASON BEING PATIENT'S ONCOLOGIST